# Patient Record
Sex: MALE | Race: WHITE | NOT HISPANIC OR LATINO | Employment: OTHER | URBAN - METROPOLITAN AREA
[De-identification: names, ages, dates, MRNs, and addresses within clinical notes are randomized per-mention and may not be internally consistent; named-entity substitution may affect disease eponyms.]

---

## 2018-07-15 ENCOUNTER — HOSPITAL ENCOUNTER (EMERGENCY)
Facility: HOSPITAL | Age: 28
Discharge: HOME | End: 2018-07-15
Attending: EMERGENCY MEDICINE
Payer: MEDICAID

## 2018-07-15 VITALS
OXYGEN SATURATION: 99 % | HEIGHT: 68 IN | BODY MASS INDEX: 34.86 KG/M2 | TEMPERATURE: 98 F | SYSTOLIC BLOOD PRESSURE: 128 MMHG | WEIGHT: 230 LBS | RESPIRATION RATE: 20 BRPM | HEART RATE: 87 BPM | DIASTOLIC BLOOD PRESSURE: 83 MMHG
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
## 2018-07-15 DIAGNOSIS — M54.2 BILATERAL NECK PAIN: Primary | ICD-10-CM

## 2018-07-15 DIAGNOSIS — M25.569 ANTERIOR KNEE PAIN: ICD-10-CM

## 2018-07-15 PROCEDURE — 25000003 PHARM REV CODE 250: Performed by: EMERGENCY MEDICINE

## 2018-07-15 PROCEDURE — 99284 EMERGENCY DEPT VISIT MOD MDM: CPT

## 2018-07-15 RX ORDER — OXYCODONE AND ACETAMINOPHEN 5; 325 MG/1; MG/1
1 TABLET ORAL
Status: COMPLETED | OUTPATIENT
Start: 2018-07-15 | End: 2018-07-15

## 2018-07-15 RX ORDER — BUSPIRONE HYDROCHLORIDE 5 MG/1
5 TABLET ORAL 2 TIMES DAILY
COMMUNITY
End: 2019-03-12

## 2018-07-15 RX ORDER — ALPRAZOLAM 0.5 MG/1
0.5 TABLET ORAL DAILY PRN
COMMUNITY
End: 2019-03-12

## 2018-07-15 RX ORDER — DICLOFENAC SODIUM 50 MG/1
50 TABLET, DELAYED RELEASE ORAL 2 TIMES DAILY
COMMUNITY
End: 2019-03-12

## 2018-07-15 RX ORDER — MELOXICAM 15 MG/1
15 TABLET ORAL DAILY
COMMUNITY
End: 2019-03-12

## 2018-07-15 RX ADMIN — OXYCODONE HYDROCHLORIDE AND ACETAMINOPHEN 1 TABLET: 5; 325 TABLET ORAL at 06:07

## 2018-07-15 NOTE — ED PROVIDER NOTES
Encounter Date: 7/15/2018       History     Chief Complaint   Patient presents with    Fall     pt fell 2 days ago and again yesterday. States his legs gave out. C/o pain to entire body, and states he has been in bed all day.      Chucho Ulloa is a 27 y.o. male who  has a past medical history of Muscular dystrophy.    The patient presents to the ED due to fall. He reports history of muscular dystrophy, and typically walks with an assistive device, usually a walker or crutches.  He states earlier today, he felt weak and fell onto his right leg.  He reports pain to the right knee.  He denies any syncope/loss of consciousness, head impact, or back pain. He does report bilateral muscular neck pain as well as diffuse body aches.  He denies any fever or recent illness.  He has not taken anything for pain.          Review of patient's allergies indicates:  No Known Allergies  Past Medical History:   Diagnosis Date    Muscular dystrophy      Past Surgical History:   Procedure Laterality Date    CYST REMOVAL      HERNIA REPAIR       No family history on file.  Social History   Substance Use Topics    Smoking status: Current Every Day Smoker     Packs/day: 0.50     Types: Cigarettes    Smokeless tobacco: Not on file    Alcohol use No     Review of Systems   Constitutional: Negative for chills and fever.   HENT: Negative for sore throat.    Respiratory: Negative for shortness of breath.    Cardiovascular: Negative for chest pain.   Gastrointestinal: Negative for constipation, diarrhea, nausea and vomiting.   Genitourinary: Negative for dysuria, frequency and urgency.   Musculoskeletal: Positive for arthralgias, myalgias and neck pain. Negative for back pain and neck stiffness.   Skin: Negative for rash and wound.   Neurological: Negative for weakness.   Hematological: Does not bruise/bleed easily.   Psychiatric/Behavioral: Negative for agitation, behavioral problems and confusion.       Physical Exam     Initial  Vitals [07/15/18 1711]   BP Pulse Resp Temp SpO2   128/83 87 20 98.3 °F (36.8 °C) 99 %      MAP       --         Physical Exam    Nursing note and vitals reviewed.  Constitutional: He appears well-developed and well-nourished. He is not diaphoretic. No distress.   HENT:   Head: Normocephalic and atraumatic.   Mouth/Throat: Oropharynx is clear and moist.   Eyes: EOM are normal. Pupils are equal, round, and reactive to light.   Neck: No tracheal deviation present.   Cardiovascular: Normal rate, regular rhythm, normal heart sounds and intact distal pulses.   Pulmonary/Chest: Breath sounds normal. No stridor. No respiratory distress.   Abdominal: Soft. He exhibits no distension and no mass. There is no tenderness.   Musculoskeletal: Normal range of motion. He exhibits no edema.        Right knee: He exhibits normal range of motion, no swelling, no ecchymosis, no deformity, no laceration and no bony tenderness. No tenderness found.        Left knee: He exhibits normal range of motion, no swelling, no ecchymosis, no deformity, no laceration and no bony tenderness. No tenderness found.        Cervical back: He exhibits no tenderness and no bony tenderness.        Thoracic back: He exhibits no tenderness and no bony tenderness.        Lumbar back: He exhibits no tenderness and no bony tenderness.        Back:    Enlarged calves bilaterally consistent with muscular dystrophy.  No bruising or obvious deformity.   Neurological: He is alert and oriented to person, place, and time. No cranial nerve deficit or sensory deficit.   Skin: Skin is warm and dry. Capillary refill takes less than 2 seconds. No rash noted.   Psychiatric: He has a normal mood and affect. His behavior is normal. Thought content normal.         ED Course   Procedures  Labs Reviewed - No data to display       Imaging Results          CT Cervical Spine Without Contrast (Final result)  Result time 07/15/18 19:18:50    Final result by Manjinder Francois MD (07/15/18  19:18:50)                 Impression:      No evidence of acute fracture or listhesis of the cervical spine.      Electronically signed by: Manjinder Francois MD  Date:    07/15/2018  Time:    19:18             Narrative:    EXAMINATION:  CT CERVICAL SPINE WITHOUT CONTRAST    CLINICAL HISTORY:  neck pain after fall;    TECHNIQUE:  Low dose axial images, sagittal and coronal reformations were performed though the cervical spine.  Contrast was not administered.    COMPARISON:  No direct comparison is available.    FINDINGS:  The visualized portions of the posterior fossa are within normal limits.  The craniocervical junction is within normal limits.  No prevertebral soft tissue swelling is identified.    The cervical alignment is unremarkable.  The vertebral body heights are maintained.  The posterior elements are within normal limits.  The lateral masses of C1 are nondisplaced.  The odontoid is unremarkable.  There is no evidence of perched or jumped facet.  The intervertebral disc spaces are within normal limits.  There is no evidence of mass effect in the spinal canal.  There is no evidence of spinal canal narrowing.    The visualized soft tissues of the neck are within normal limits.  There is no evidence of lymphadenopathy in the neck.  There is no evidence of a pneumothorax.                               X-Ray Chest AP Portable (Final result)  Result time 07/15/18 18:32:28    Final result by Asim Mcrae MD (07/15/18 18:32:28)                 Impression:      1. No acute cardiopulmonary process, hypoventilatory exam.      Electronically signed by: Asim Mcrae MD  Date:    07/15/2018  Time:    18:32             Narrative:    EXAMINATION:  XR CHEST AP PORTABLE    CLINICAL HISTORY:  rib pain after fall;    TECHNIQUE:  Single frontal view of the chest was performed.    COMPARISON:  10/27/2016    FINDINGS:  The cardiomediastinal silhouette is not enlarged.  There is no pleural effusion.  The trachea is midline.   The lungs are symmetrically expanded bilaterally with mildly coarse interstitial attenuation, likely related to shallow inspiratory effort.  There is minimal left basilar subsegmental atelectasis..  No large focal consolidation seen.  There is no pneumothorax.  The osseous structures are unremarkable.                               X-Ray Hips Bilateral 2 View Incl AP Pelvis (Final result)  Result time 07/15/18 18:33:25    Final result by Asim Mcrae MD (07/15/18 18:33:25)                 Impression:      1. No acute displaced fracture or dislocation of the pelvis or hips.      Electronically signed by: Asim Mcrae MD  Date:    07/15/2018  Time:    18:33             Narrative:    EXAMINATION:  XR HIPS BILATERAL 2 VIEW INCL AP PELVIS    CLINICAL HISTORY:  Unspecified fall, initial encounter    TECHNIQUE:  AP view of the pelvis and frogleg lateral views of both hips were performed.    COMPARISON:  None.    FINDINGS:  Three views.    The bilateral sacroiliac joints are intact.  The pubic symphysis is intact.  The bilateral femoral heads maintain anatomic relationship with their respective acetabula.  The visualized lower lumbar spine is intact.                               X-Ray Knee 3 View Right (Final result)  Result time 07/15/18 18:33:51    Final result by Asim Mcrae MD (07/15/18 18:33:51)                 Impression:      1. No acute displaced fracture or dislocation of the knee.      Electronically signed by: Asim Mcrae MD  Date:    07/15/2018  Time:    18:33             Narrative:    EXAMINATION:  XR KNEE 3 VIEW RIGHT    CLINICAL HISTORY:  Pain in unspecified knee    TECHNIQUE:  AP, lateral, and Merchant views of the right knee were performed.    COMPARISON:  None    FINDINGS:  Three views.    No acute displaced fracture or dislocation of the knee.  No radiopaque foreign body.  No significant knee joint effusion.                                 Medical Decision Making:   Initial Assessment:    27-year-old male with history of muscular dystrophy presents after mechanical fall.  Reports diffuse muscle aches, but states most pain present to right knee.  Exam grossly unremarkable, no deformity, bruising, or abrasion.  Plan to obtain x-rays and reassess.  Differential Diagnosis:   Differential Diagnosis includes, but is not limited to:  Fracture, dislocation, compartment syndrome, nerve injury/palsy, vascular injury, rhabdomyolysis, hemarthrosis, septic joint, bursitis, muscle strain, ligament tear/sprain, laceration with foreign body, abrasion, soft tissue contusion, osteoarthritis.    Clinical Tests:   Radiological Study: Ordered and Reviewed  ED Management:  X-rays grossly unremarkable, no evidence of acute fracture or traumatic injury.  Patient updated in reassured, counseled on symptomatic and supportive care, and recommend using assistance with ambulation as needed.  Recommend close follow-up with PCP or Neurology as needed.  Given return precautions including worsening symptoms or any other concerns.  Upon re-evaluation, the patient's status has improved.  After complete ED evaluation, clinical impression is most consistent with muscle weakness, contusions.  At this time, I feel there is no emergent condition requiring further evaluation or admission. I believe the patient is stable for discharge from the ED. The patient and any additional family present were updated with test results, overall clinical impression, and recommended further plan of care. All questions were answered. The patient expressed understanding and agreed with current plan for discharge with PCp follow-up within 1 week. Strict return precautions were provided, including return/worsening of current symptoms or any other concerns.                         Clinical Impression:   The primary encounter diagnosis was Bilateral neck pain. Diagnoses of Fall and Anterior knee pain were also pertinent to this visit.              IDr. Willoughby  Margaret, personally performed the services described in this documentation. All medical record entries made by the scribe were at my direction and in my presence.  I have reviewed the chart and agree that the record reflects my personal performance and is accurate and complete.     Case Robles MD.               Case Robles MD  08/01/18 [id number]

## 2018-08-04 ENCOUNTER — HOSPITAL ENCOUNTER (EMERGENCY)
Facility: HOSPITAL | Age: 28
Discharge: HOME | End: 2018-08-05
Attending: EMERGENCY MEDICINE
Payer: MEDICAID

## 2018-08-04 DIAGNOSIS — S80.02XA CONTUSION OF LEFT KNEE, INITIAL ENCOUNTER: ICD-10-CM

## 2018-08-04 DIAGNOSIS — S62.619A CLOSED DISPLACED FRACTURE OF PROXIMAL PHALANX OF FINGER OF RIGHT HAND: ICD-10-CM
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
## 2018-08-04 DIAGNOSIS — S89.90XA KNEE INJURY: ICD-10-CM

## 2018-08-04 PROCEDURE — 99283 EMERGENCY DEPT VISIT LOW MDM: CPT | Mod: 25

## 2018-08-04 RX ORDER — OXYCODONE AND ACETAMINOPHEN 5; 325 MG/1; MG/1
1 TABLET ORAL
Status: COMPLETED | OUTPATIENT
Start: 2018-08-05 | End: 2018-08-05

## 2018-08-05 VITALS
BODY MASS INDEX: 34.86 KG/M2 | WEIGHT: 230 LBS | TEMPERATURE: 98 F | HEART RATE: 85 BPM | SYSTOLIC BLOOD PRESSURE: 108 MMHG | OXYGEN SATURATION: 98 % | RESPIRATION RATE: 18 BRPM | HEIGHT: 68 IN | DIASTOLIC BLOOD PRESSURE: 71 MMHG

## 2018-08-05 PROCEDURE — 26720 TREAT FINGER FRACTURE EACH: CPT | Mod: F7

## 2018-08-05 PROCEDURE — 25000003 PHARM REV CODE 250: Performed by: EMERGENCY MEDICINE

## 2018-08-05 RX ORDER — OXYCODONE AND ACETAMINOPHEN 5; 325 MG/1; MG/1
1 TABLET ORAL EVERY 6 HOURS PRN
Qty: 15 TABLET | Refills: 0 | Status: SHIPPED | OUTPATIENT
Start: 2018-08-05 | End: 2018-10-21 | Stop reason: ALTCHOICE

## 2018-08-05 RX ADMIN — OXYCODONE HYDROCHLORIDE AND ACETAMINOPHEN 1 TABLET: 5; 325 TABLET ORAL at 12:08

## 2018-08-05 NOTE — DISCHARGE INSTRUCTIONS
Take medications as prescribed.  He may also take Mobic or Voltaren as needed for pain.  If you do not have those, Naprosyn or Motrin can also be taken.  Rest, ice and elevate your affected areas.  Follow-up with the orthopedists as recommended in the next week or 2.    Thank you for choosing [facility]! We appreciate you coming to us for your medical care. We hope you feel better soon! Please come back to [facility] for all of your future medical needs.      Sincerely,    Ta Pearson MD  Medical Director  Emergency Department  [facility]

## 2018-08-05 NOTE — ED NOTES
Foam/metal Splint applied to middle finger of right hand. Instructed to leave on finger at all times until seen by Dr. Perez.  Discharge instructions given and explained.  Prescriptions given and explained.

## 2018-08-05 NOTE — ED NOTES
To ER with c/o pain and swelling to right middle finger and hand and left anterior lower knee after falling at 1100 am today.  Pt states that his swelling has increased as the day has progressed.  Denies hitting his head or LOC.  Awake and alert, oriented x 4.  resp even and unlabored.  Lungs clear.  abd soft and non tender.

## 2018-08-11 NOTE — ED PROVIDER NOTES
Encounter Date: 8/4/2018       History     Chief Complaint   Patient presents with    Fall     Foot got caught in door and lost his balance. Complaning of right hand pain and left knee pain     HPI   This is a 27 y.o. male who has a past medical history of Muscular dystrophy.   The patient presents to the Emergency Department s/p fall.  Patient is difficulty with ambulation and he got his foot caught in the door and lost balance.  He does not know how he fell but his left knee and right hand hurt.  Symptoms are associated with swelling and bruising to right hand.  Patient is unable to close his fist.  Pt denies wounds, head injury, LOC.   Symptoms are aggravated by movement and palpation.  Symptoms are relieved by nothing.   Pt has a past surgical history that includes Cyst Removal and Hernia repair.     Review of patient's allergies indicates:  No Known Allergies  Past Medical History:   Diagnosis Date    Muscular dystrophy      Past Surgical History:   Procedure Laterality Date    CYST REMOVAL      HERNIA REPAIR       No family history on file.  Social History   Substance Use Topics    Smoking status: Current Every Day Smoker     Packs/day: 0.50     Types: Cigarettes    Smokeless tobacco: Not on file    Alcohol use No     Review of Systems   Constitutional: Negative for fever.   HENT: Negative for sore throat.    Respiratory: Negative for shortness of breath.    Cardiovascular: Negative for chest pain.   Gastrointestinal: Negative for nausea.   Genitourinary: Negative for dysuria.   Musculoskeletal: Positive for arthralgias and gait problem. Negative for back pain.   Skin: Positive for color change. Negative for rash.   Neurological: Negative for weakness.   Hematological: Does not bruise/bleed easily.       Physical Exam     Initial Vitals [08/04/18 2311]   BP Pulse Resp Temp SpO2   135/86 88 18 98.5 °F (36.9 °C) 98 %      MAP       --         Physical Exam    Nursing note and vitals  reviewed.  Constitutional: He appears well-developed and well-nourished. No distress.   HENT:   Head: Normocephalic and atraumatic.   Mouth/Throat: Oropharynx is clear and moist.   Eyes: Conjunctivae are normal. Pupils are equal, round, and reactive to light.   Neck: Normal range of motion. Neck supple.   Cardiovascular: Normal rate, regular rhythm, normal heart sounds and intact distal pulses.   Radial pulses 2+ bilaterally   Pulmonary/Chest: Breath sounds normal. No respiratory distress.   Abdominal: Soft. Bowel sounds are normal. He exhibits no distension. There is no tenderness.   Musculoskeletal: Normal range of motion. He exhibits no edema or tenderness.   Ecchymosis and swelling to the right 3rd MTP, tender to palpation. Decreased range of motion secondary to pain.    Mild tenderness to the patella of the left knee.  There is normal range of motion, negative for laxity and Lachman's test    Lymphadenopathy:     He has no cervical adenopathy.   Neurological: He is alert and oriented to person, place, and time.   Skin: Skin is warm and dry. Capillary refill takes less than 2 seconds. No rash noted. No erythema.   Psychiatric: He has a normal mood and affect. Thought content normal.         ED Course   Procedures  Labs Reviewed - No data to display       Imaging Results          X-Ray Knee 3 View Left (Final result)  Result time 08/05/18 00:29:17    Final result by Chucho Medeiros MD (08/05/18 00:29:17)                 Impression:      No acute fracture.      Electronically signed by: Chucho Medeiros MD  Date:    08/05/2018  Time:    00:29             Narrative:    EXAMINATION:  XR KNEE 3 VIEW LEFT    CLINICAL HISTORY:  Unspecified injury of unspecified lower leg, initial encounter    TECHNIQUE:  AP, lateral, and Merchant views of the left knee were performed.    COMPARISON:  Right knee July 15, 2018.    FINDINGS:  No fracture or dislocation.  No joint effusion.  Mild narrowing of the medial tibial femoral  compartment, similar to the right knee on the July 15, 2018 exam.                               X-Ray Hand 3 view Right (Final result)  Result time 08/05/18 00:26:24    Final result by Chucho Medeiros MD (08/05/18 00:26:24)                 Impression:      Minimally displaced acute oblique fracture involving the posteromedial aspect of the base of the proximal phalanx of the 3rd digit right hand with intra-articular extension to the MCP joint.    No additional fracture.  No dislocation.      Electronically signed by: Chucho Medeiros MD  Date:    08/05/2018  Time:    00:26             Narrative:    EXAMINATION:  XR HAND COMPLETE 3 VIEW RIGHT    CLINICAL HISTORY:  fall, 3-4th MTP pain and finger swelling;    TECHNIQUE:  PA, lateral, and oblique views of the right hand were performed.    COMPARISON:  None    FINDINGS:  Minimally displaced acute oblique fracture involving the posteromedial aspect of the base of the proximal phalanx of the 3rd digit right hand with intra-articular extension.  No additional fracture.  No dislocation.  Surrounding soft tissue swelling present.                                   Medical Decision Making:   Initial Assessment:   This is an emergent evaluation of a 27 y.o.male patient with presentation of right hand pain and left knee pain s/p fall.   Initial differentials include but are not limited to:  Contusion, fracture, sprain, subluxation/dislocation  Plan:  X-ray right hand and knee, pain control, ice    ED Management:  X-ray of the right hand was remarkable for fracture of the proximal phalanx of the middle finger.  X-ray of the left knee was unremarkable. Patient put in finger splint, referred to Ortho hand.  Rx for pain control, return for any concerns.                      Clinical Impression:     1. Fall, initial encounter    2. Knee injury    3. Closed displaced fracture of proximal phalanx of finger of right hand    4. Contusion of left knee, initial encounter                                 Ta Pearson MD  08/11/18 0506

## 2018-10-21 ENCOUNTER — OFFICE VISIT (OUTPATIENT)
Dept: URGENT CARE | Facility: CLINIC | Age: 28
End: 2018-10-21
Payer: MEDICAID

## 2018-10-21 VITALS
OXYGEN SATURATION: 99 % | DIASTOLIC BLOOD PRESSURE: 85 MMHG | SYSTOLIC BLOOD PRESSURE: 125 MMHG | WEIGHT: 230 LBS | BODY MASS INDEX: 34.86 KG/M2 | RESPIRATION RATE: 18 BRPM | HEIGHT: 68 IN | HEART RATE: 92 BPM | TEMPERATURE: 98 F

## 2018-10-21 DIAGNOSIS — M54.12 CERVICAL RADICULAR PAIN: ICD-10-CM

## 2018-10-21 DIAGNOSIS — M54.2 NECK PAIN: ICD-10-CM

## 2018-10-21 DIAGNOSIS — M62.838 CERVICAL PARASPINAL MUSCLE SPASM: Primary | ICD-10-CM

## 2018-10-21 PROCEDURE — 72040 X-RAY EXAM NECK SPINE 2-3 VW: CPT | Mod: FY,S$GLB,, | Performed by: RADIOLOGY

## 2018-10-21 PROCEDURE — 99203 OFFICE O/P NEW LOW 30 MIN: CPT | Mod: S$GLB,,, | Performed by: PHYSICIAN ASSISTANT

## 2018-10-21 RX ORDER — HYDROCODONE BITARTRATE AND ACETAMINOPHEN 7.5; 325 MG/1; MG/1
1 TABLET ORAL EVERY 8 HOURS PRN
Qty: 12 TABLET | Refills: 0 | Status: SHIPPED | OUTPATIENT
Start: 2018-10-21 | End: 2018-10-25

## 2018-10-21 NOTE — PATIENT INSTRUCTIONS
Neck Pain    There are several possible causes of neck pain when there is no injury:  · You can get a minor ligament sprain or muscle strain from a sudden minor neck movement. Sleeping with your neck in an awkward position can also cause this.  · Some people respond to emotional stress by tensing the muscles of their neck, shoulders, and upper back. Chronic spasm in these muscles can cause neck pain and sometimes headaches.  · Gradual wear and tear of the joints in the spine can cause degenerative arthritis. This can be a source of occasional or chronic neck pain.  · The spinal disks may bulge and put pressure on a nearby spinal nerve. This can happen as a natural result of aging or repeated small injuries to the neck. The spinal disks are the cushions between each spinal bone. This causes tingling, pain, or numbness that spreads from the neck to the shoulder, arm, or hand on one side.  Acute neck pain usually gets better in 1 to 2 weeks. Neck pain related to disk disease, arthritis in the spinal joints, or spinal stenosis can become chronic and last for months or years. Spinal stenosis is narrowing of the spinal canal.  X-rays are usually not ordered for the initial evaluation of neck pain. However, X-rays may be done if you had a forceful physical injury, such as a car accident or fall. If pain continues and doesnt respond to medical treatment, X-rays and other tests may be done at a later time.  Home care  · Rest and relax the muscles. Use a comfortable pillow that supports the head. It should also help keep the spine in a neutral position. The position of the head should not be tilted forward or backward. A rolled up towel may help for a custom fit.  · Some people find relief with heat. Heat can be applied with either a warm shower or bath or a moist towel heated in the microwave and massage. Others prefer cold packs. You can make an ice pack by filling a plastic bag that seals at the top with ice cubes or  crushed ice and then wrapping it with a thin towel. Try both and use the method that feels best for 15 to 20 minutes, several times a day.  · Whether using ice or heat, be careful that you do not injure your skin. Never put ice directly on the skin. Always wrap the ice in a towel or other type of cloth.This is very important, especially in people with poor skin sensations.   · Try to reduce your stress level. Emotional stress can lead to neck muscle tension and get in the way of or delay the healing process.  · You may use over-the-counter pain medicine to control pain, unless another medicine was prescribed. If you have chronic liver or kidney disease or ever had a stomach ulcer or GI bleeding, talk with your healthcare provider before using these medicines.  Follow-up care  Follow up with your healthcare provider if your symptoms do not show signs of improvement after one week. Physical therapy or further tests may be needed.  If X-rays, CT scans, or MRI scans were taken, you will be told of any new findings that may affect your care.  Call 911  Call 911 if you have:  · Sudden weakness or numbness in one or both arms  · Neck swelling, difficulty or painful swallowing  · Difficulty breathing  · Chest pain  When to seek medical advice  Call your healthcare provider right away if any of these occur:  · Pain becomes worse or spreads into one or both arm  · Increasing headache  · Fever of 100.4°F (38°C) or above lasting for 24 to 48 hours  Date Last Reviewed: 7/1/2016  © 3294-6671 [employer]. [address] [place] [address], [place] [address]. All rights reserved. This information is not intended as a substitute for professional medical care. Always follow your healthcare professional's instructions.      Please follow up with your Primary care provider within 2-5 days if your signs and symptoms have not resolved or worsen.     If your condition worsens or fails to improve we recommend that you receive another  evaluation at the emergency room immediately or contact your primary medical clinic to discuss your concerns.   You must understand that you have received an Urgent Care treatment only and that you may be released before all of your medical problems are known or treated. You, the patient, will arrange for follow up care as instructed.     RED FLAGS/WARNING SYMPTOMS DISCUSSED WITH PATIENT THAT WOULD WARRANT EMERGENT MEDICAL ATTENTION. PATIENT VERBALIZED UNDERSTANDING.

## 2018-10-21 NOTE — PROGRESS NOTES
"Subjective:       Patient ID: Chucho Ulloa is a 27 y.o. male.    Vitals:  height is 5' 8" (1.727 m) and weight is 104.3 kg (230 lb). His oral temperature is 98.2 °F (36.8 °C). His blood pressure is 125/85 and his pulse is 92. His respiration is 18 and oxygen saturation is 99%.     Chief Complaint: Neck Pain (fell backward on 10/20/2018 outside  on concrete)    History of muscular dystrophy and instability. Did not hit his head or lose consciousness. Complains of neck pain. Had one episode of sharp shooting pain down his left arm. Denies numbness or tingling of arms or legs.       Neck Pain    This is a new problem. The current episode started yesterday. The problem occurs constantly. The problem has been gradually worsening. The pain is associated with a fall. The pain is present in the midline. The quality of the pain is described as stabbing. The pain is at a severity of 8/10. The pain is severe. The symptoms are aggravated by bending, position, twisting, sneezing and coughing. The pain is same all the time. Stiffness is present all day. Pertinent negatives include no chest pain, numbness, syncope or weakness. He has tried nothing for the symptoms.     Review of Systems   Constitution: Negative for weakness and malaise/fatigue.   HENT: Negative for nosebleeds.    Cardiovascular: Negative for chest pain and syncope.   Respiratory: Negative for shortness of breath.    Musculoskeletal: Positive for falls, joint swelling, muscle cramps and neck pain. Negative for back pain, joint pain and stiffness.   Gastrointestinal: Negative for abdominal pain.   Genitourinary: Negative for hematuria.   Neurological: Negative for dizziness and numbness.       Objective:      Physical Exam   Constitutional: He is oriented to person, place, and time. He appears well-developed and well-nourished. He is cooperative.  Non-toxic appearance. He does not appear ill. No distress.   HENT:   Head: Normocephalic and atraumatic.   Right Ear: " Hearing, tympanic membrane, external ear and ear canal normal.   Left Ear: Hearing, tympanic membrane, external ear and ear canal normal.   Nose: Nose normal. No mucosal edema, rhinorrhea or nasal deformity. No epistaxis. Right sinus exhibits no maxillary sinus tenderness and no frontal sinus tenderness. Left sinus exhibits no maxillary sinus tenderness and no frontal sinus tenderness.   Mouth/Throat: Uvula is midline, oropharynx is clear and moist and mucous membranes are normal. No trismus in the jaw. Normal dentition. No uvula swelling. No posterior oropharyngeal erythema.   Eyes: Conjunctivae and lids are normal. Right eye exhibits no discharge. Left eye exhibits no discharge. No scleral icterus.   Sclera clear bilat   Neck: Trachea normal and phonation normal. Neck supple. Spinous process tenderness and muscular tenderness present. Decreased range of motion present. No edema and no erythema present.       Cardiovascular: Normal rate, regular rhythm, normal heart sounds, intact distal pulses and normal pulses.   Pulmonary/Chest: Effort normal and breath sounds normal. No respiratory distress.   Abdominal: Soft. Normal appearance and bowel sounds are normal. He exhibits no distension, no pulsatile midline mass and no mass. There is no tenderness.   Musculoskeletal: He exhibits no edema or deformity.        Cervical back: He exhibits decreased range of motion, tenderness, bony tenderness, pain and spasm. He exhibits no swelling, no edema, no deformity, no laceration and normal pulse.        Thoracic back: He exhibits normal range of motion, no tenderness, no bony tenderness, no swelling, no edema, no deformity, no laceration, no pain, no spasm and normal pulse.        Lumbar back: He exhibits normal range of motion, no tenderness, no bony tenderness, no swelling, no edema, no deformity, no laceration, no pain, no spasm and normal pulse.        Back:    NEG ST LEG RAISE  FULL ROM B UE AND LE WITH 5/5 STRENGTH  NVIT  DISTALLY WITH SILT AND 2+BCR  ABLE TO AMBULATE WITH SMOOTH RHYTHMIC GAIT     Neurological: He is alert and oriented to person, place, and time. No cranial nerve deficit or sensory deficit. He exhibits abnormal muscle tone (muscular atrophy noted with fatty calves and leg weakness; normal for patient). Coordination normal.   Reflex Scores:       Tricep reflexes are 2+ on the right side and 2+ on the left side.       Bicep reflexes are 2+ on the right side and 2+ on the left side.  Skin: Skin is warm, dry and intact. He is not diaphoretic. No pallor.   Psychiatric: He has a normal mood and affect. His speech is normal and behavior is normal. Judgment and thought content normal. Cognition and memory are normal.   Nursing note and vitals reviewed.      XRAY: No fractures noted. No acute injuries noted    Assessment:       1. Cervical paraspinal muscle spasm    2. Cervical radicular pain    3. Neck pain        Plan:         Cervical paraspinal muscle spasm  -     HYDROcodone-acetaminophen (NORCO) 7.5-325 mg per tablet; Take 1 tablet by mouth every 8 (eight) hours as needed for Pain.  Dispense: 12 tablet; Refill: 0    Cervical radicular pain    Neck pain  -     X-Ray Cervical Spine 2 or 3 Views; Future; Expected date: 10/21/2018    UNABLE TO TAKE MUSCLE RELAXERS DUE TO DYSTROPHY. NSAIDS GIVE PATIENT MUSCLE CRAMPING.           Neck Pain    There are several possible causes of neck pain when there is no injury:  · You can get a minor ligament sprain or muscle strain from a sudden minor neck movement. Sleeping with your neck in an awkward position can also cause this.  · Some people respond to emotional stress by tensing the muscles of their neck, shoulders, and upper back. Chronic spasm in these muscles can cause neck pain and sometimes headaches.  · Gradual wear and tear of the joints in the spine can cause degenerative arthritis. This can be a source of occasional or chronic neck pain.  · The spinal disks may bulge and  put pressure on a nearby spinal nerve. This can happen as a natural result of aging or repeated small injuries to the neck. The spinal disks are the cushions between each spinal bone. This causes tingling, pain, or numbness that spreads from the neck to the shoulder, arm, or hand on one side.  Acute neck pain usually gets better in 1 to 2 weeks. Neck pain related to disk disease, arthritis in the spinal joints, or spinal stenosis can become chronic and last for months or years. Spinal stenosis is narrowing of the spinal canal.  X-rays are usually not ordered for the initial evaluation of neck pain. However, X-rays may be done if you had a forceful physical injury, such as a car accident or fall. If pain continues and doesnt respond to medical treatment, X-rays and other tests may be done at a later time.  Home care  · Rest and relax the muscles. Use a comfortable pillow that supports the head. It should also help keep the spine in a neutral position. The position of the head should not be tilted forward or backward. A rolled up towel may help for a custom fit.  · Some people find relief with heat. Heat can be applied with either a warm shower or bath or a moist towel heated in the microwave and massage. Others prefer cold packs. You can make an ice pack by filling a plastic bag that seals at the top with ice cubes or crushed ice and then wrapping it with a thin towel. Try both and use the method that feels best for 15 to 20 minutes, several times a day.  · Whether using ice or heat, be careful that you do not injure your skin. Never put ice directly on the skin. Always wrap the ice in a towel or other type of cloth.This is very important, especially in people with poor skin sensations.   · Try to reduce your stress level. Emotional stress can lead to neck muscle tension and get in the way of or delay the healing process.  · You may use over-the-counter pain medicine to control pain, unless another medicine was  prescribed. If you have chronic liver or kidney disease or ever had a stomach ulcer or GI bleeding, talk with your healthcare provider before using these medicines.  Follow-up care  Follow up with your healthcare provider if your symptoms do not show signs of improvement after one week. Physical therapy or further tests may be needed.  If X-rays, CT scans, or MRI scans were taken, you will be told of any new findings that may affect your care.  Call 911  Call 911 if you have:  · Sudden weakness or numbness in one or both arms  · Neck swelling, difficulty or painful swallowing  · Difficulty breathing  · Chest pain  When to seek medical advice  Call your healthcare provider right away if any of these occur:  · Pain becomes worse or spreads into one or both arm  · Increasing headache  · Fever of 100.4°F (38°C) or above lasting for 24 to 48 hours  Date Last Reviewed: 7/1/2016  © 1619-6959 [employer]. [address] [place] [address]. All rights reserved. This information is not intended as a substitute for professional medical care. Always follow your healthcare professional's instructions.      Please follow up with your Primary care provider within 2-5 days if your signs and symptoms have not resolved or worsen.     If your condition worsens or fails to improve we recommend that you receive another evaluation at the emergency room immediately or contact your primary medical clinic to discuss your concerns.   You must understand that you have received an Urgent Care treatment only and that you may be released before all of your medical problems are known or treated. You, the patient, will arrange for follow up care as instructed.     RED FLAGS/WARNING SYMPTOMS DISCUSSED WITH PATIENT THAT WOULD WARRANT EMERGENT MEDICAL ATTENTION. PATIENT VERBALIZED UNDERSTANDING.

## 2019-03-12 ENCOUNTER — HOSPITAL ENCOUNTER (EMERGENCY)
Facility: HOSPITAL | Age: 29
Discharge: HOME | End: 2019-03-12
Attending: EMERGENCY MEDICINE
Payer: MEDICAID

## 2019-03-12 VITALS
RESPIRATION RATE: 20 BRPM | TEMPERATURE: 99 F | HEIGHT: 68 IN | WEIGHT: 235 LBS | OXYGEN SATURATION: 99 % | DIASTOLIC BLOOD PRESSURE: 94 MMHG | SYSTOLIC BLOOD PRESSURE: 134 MMHG | BODY MASS INDEX: 35.61 KG/M2 | HEART RATE: 91 BPM

## 2019-03-12 DIAGNOSIS — L73.9 FOLLICULITIS: ICD-10-CM

## 2019-03-12 DIAGNOSIS — L08.9 SKIN PUSTULE: Primary | ICD-10-CM

## 2019-03-12 PROCEDURE — 99284 EMERGENCY DEPT VISIT MOD MDM: CPT

## 2019-03-12 RX ORDER — SULFAMETHOXAZOLE AND TRIMETHOPRIM 800; 160 MG/1; MG/1
1 TABLET ORAL 2 TIMES DAILY
Qty: 10 TABLET | Refills: 0 | Status: SHIPPED | OUTPATIENT
Start: 2019-03-12 | End: 2019-03-17

## 2019-03-13 NOTE — ED NOTES
APPEARANCE: Alert, oriented and in no acute distress.  CARDIAC: Normal rate and rhythm, no murmur heard.   PERIPHERAL VASCULAR: peripheral pulses present. Normal cap refill. No edema. Warm to touch.    RESPIRATORY:Normal rate and effort, breath sounds clear bilaterally throughout chest. Respirations are equal and unlabored no obvious signs of distress.  GASTRO: soft, bowel sounds normal, no tenderness, no abdominal distention.  MUSC: Full ROM. No bony tenderness or soft tissue tenderness. No obvious deformity.  SKIN: Skin is warm and dry, normal skin turgor, mucous membranes moist. Pt has several small bumps and one larger quarter sized bump to left side of neck.   NEURO: 5/5 strength major flexors/extensors bilaterally. Sensory intact to light touch bilaterally. [place] Mark coma scale: eyes open spontaneously-4, oriented & converses-5, obeys commands-6. No neurological abnormalities.   MENTAL STATUS: awake, alert and aware of environment.  EYE: PERRL, both eyes: pupils brisk and reactive to light. Normal size.  ENT: EARS: no obvious drainage. NOSE: no active bleeding.   Pt complains of abscesses to his left neck area.

## 2019-03-13 NOTE — ED PROVIDER NOTES
Encounter Date: 3/12/2019    SCRIBE #1 NOTE: I, Laine Nur, am scribing for, and in the presence of,  Dr. Robles. I have scribed the entire note.       History     Chief Complaint   Patient presents with    Abscess     on left side of neck x3 days; denies fever or drainage;      Chucho Ulloa is a 28 y.o. male who  has a past medical history of Muscular dystrophy.    The patient presents to the ED due to possible abscess. He reports he started noticing swelling about a week ago while on vacation in [place]. He reports it gradually became more swollen, and noticed today the lesion has been draining blood. The patient denies any purulent drainage or fever but admits to mild nausea. He does note 4 days ago he had chills which resolved after at hot shower. The patient has experienced similar symptoms multiple times in the past, and states he has needed them to be drained in the past.      The history is provided by the patient.     Review of patient's allergies indicates:  No Known Allergies  Past Medical History:   Diagnosis Date    Muscular dystrophy      Past Surgical History:   Procedure Laterality Date    CYST REMOVAL      HERNIA REPAIR       History reviewed. No pertinent family history.  Social History     Tobacco Use    Smoking status: Current Every Day Smoker     Packs/day: 0.50     Types: Cigarettes    Smokeless tobacco: Never Used   Substance Use Topics    Alcohol use: No    Drug use: No     Review of Systems   Constitutional: Negative for chills and fever.   HENT: Negative for congestion, ear pain, rhinorrhea and sore throat.    Respiratory: Negative for cough, shortness of breath and wheezing.    Cardiovascular: Negative for chest pain and palpitations.   Gastrointestinal: Negative for abdominal pain, diarrhea, nausea and vomiting.   Genitourinary: Negative for dysuria and hematuria.   Musculoskeletal: Negative for back pain, myalgias and neck pain.   Skin: Positive for wound. Negative for  rash.   Neurological: Negative for dizziness, weakness, light-headedness and headaches.   Psychiatric/Behavioral: Negative for confusion.       Physical Exam     Initial Vitals [03/12/19 2104]   BP Pulse Resp Temp SpO2   (!) 134/94 91 20 98.5 °F (36.9 °C) 99 %      MAP       --         Physical Exam    Nursing note and vitals reviewed.  Constitutional: He appears well-developed and well-nourished. He is not diaphoretic. No distress.   HENT:   Head: Normocephalic and atraumatic.   Mouth/Throat: Oropharynx is clear and moist.   Eyes: EOM are normal. Pupils are equal, round, and reactive to light.   Neck: No tracheal deviation present.   Cardiovascular: Normal rate, regular rhythm, normal heart sounds and intact distal pulses.   Pulmonary/Chest: Breath sounds normal. No stridor. No respiratory distress.   Abdominal: Soft. He exhibits no distension and no mass. There is no tenderness.   Musculoskeletal: Normal range of motion. He exhibits no edema.   Neurological: He is alert and oriented to person, place, and time. No cranial nerve deficit or sensory deficit.   Skin: Skin is warm and dry. Capillary refill takes less than 2 seconds. Lesion and rash noted. No abrasion noted. Rash is nodular.   Approximately 1.5 cm pustule to left neck.   Multiple scattered smaller areas of folliculitis to L cheek and face.   Psychiatric: He has a normal mood and affect. His behavior is normal. Thought content normal.         ED Course   Procedures  Labs Reviewed - No data to display       Imaging Results    None          Medical Decision Making:   Initial Assessment:   28 y.o male presents with a raised, irritated pustule to left neck starting 10 days and progressively worsening; noticed spontaneous drainage described as bloody fluid today. Exam with small pustule and multiple other scattered areas of folliculitis. Bedside US revealed no drainable fluid collection. Will treat with Bactrim and Bactroban ointment, follow up with PCP as  needed or return for worsening symptoms.   Differential Diagnosis:   Differential Diagnosis includes, but is not limited to:  Cellulitis, abscess, necrotizing fasciitis, osteomyelitis, septic joint, MRSA, DVT, drug eruption, allergic/contact dermatitis, EM/SJS, viral exanthem, local trauma/contusion    Upon re-evaluation, the patient's status has improved.  After complete ED evaluation, clinical impression is most consistent with folliculitis, pustule.  PCP follow-up within 1 week was recommended.    After taking into careful account the patient's history, physical exam findings, as well as empirical and objective data obtained throughout ED workup, I feel no emergent medical condition has been identified. No further evaluation or admission was felt to be required, and the patient is stable for discharge from the ED. The patient and any additional family present were updated with test results, overall clinical impression, and recommended further plan of care, including discharge instructions as provided and outpatient follow-up for continued evaluation and management as needed. All questions were answered. The patient expressed understanding and agreed with current plan for discharge and follow-up plan of care. Strict ED return precautions were provided, including return/worsening of current symptoms, new symptoms, or any other concerns.                        Clinical Impression:     1. Skin pustule    2. Folliculitis             I, Dr. Case Robles, personally performed the services described in this documentation. All medical record entries made by the scribe were at my direction and in my presence.  I have reviewed the chart and agree that the record reflects my personal performance and is accurate and complete.     Case Robles MD.               Case Robles MD  03/13/19 [id number]

## 2019-09-09 ENCOUNTER — HOSPITAL ENCOUNTER (EMERGENCY)
Facility: HOSPITAL | Age: 29
Discharge: LEFT BEFORE DISCHARGE | End: 2019-09-09
Payer: MEDICAID

## 2019-09-09 VITALS
HEART RATE: 121 BPM | HEIGHT: 68 IN | SYSTOLIC BLOOD PRESSURE: 125 MMHG | DIASTOLIC BLOOD PRESSURE: 79 MMHG | WEIGHT: 240 LBS | BODY MASS INDEX: 36.37 KG/M2 | TEMPERATURE: 100 F | RESPIRATION RATE: 20 BRPM | OXYGEN SATURATION: 98 %

## 2019-09-09 DIAGNOSIS — R50.9 FEVER, UNSPECIFIED FEVER CAUSE: Primary | ICD-10-CM

## 2019-09-09 DIAGNOSIS — L03.116 LEFT LEG CELLULITIS: ICD-10-CM

## 2019-09-09 LAB
ALBUMIN SERPL BCP-MCNC: 4.2 G/DL (ref 3.5–5.2)
ALP SERPL-CCNC: 67 U/L (ref 55–135)
ALT SERPL W/O P-5'-P-CCNC: 29 U/L (ref 10–44)
ANION GAP SERPL CALC-SCNC: 13 MMOL/L (ref 8–16)
AST SERPL-CCNC: 25 U/L (ref 10–40)
BILIRUB SERPL-MCNC: 0.6 MG/DL (ref 0.1–1)
BUN SERPL-MCNC: 8 MG/DL (ref 6–20)
CALCIUM SERPL-MCNC: 8.9 MG/DL (ref 8.7–10.5)
CHLORIDE SERPL-SCNC: 108 MMOL/L (ref 95–110)
CO2 SERPL-SCNC: 14 MMOL/L (ref 23–29)
CREAT SERPL-MCNC: 0.6 MG/DL (ref 0.5–1.4)
EST. GFR  (AFRICAN AMERICAN): >60 ML/MIN/1.73 M^2
EST. GFR  (NON AFRICAN AMERICAN): >60 ML/MIN/1.73 M^2
GLUCOSE SERPL-MCNC: 92 MG/DL (ref 70–110)
POTASSIUM SERPL-SCNC: 3.5 MMOL/L (ref 3.5–5.1)
PROT SERPL-MCNC: 7.4 G/DL (ref 6–8.4)
SODIUM SERPL-SCNC: 135 MMOL/L (ref 136–145)

## 2019-09-09 PROCEDURE — 99281 EMR DPT VST MAYX REQ PHY/QHP: CPT

## 2019-09-09 PROCEDURE — 80053 COMPREHEN METABOLIC PANEL: CPT

## 2019-09-09 PROCEDURE — 87040 BLOOD CULTURE FOR BACTERIA: CPT

## 2019-09-09 RX ORDER — CLINDAMYCIN PHOSPHATE 600 MG/50ML
600 INJECTION, SOLUTION INTRAVENOUS
Status: DISCONTINUED | OUTPATIENT
Start: 2019-09-09 | End: 2019-09-10 | Stop reason: HOSPADM

## 2019-09-10 ENCOUNTER — HOSPITAL ENCOUNTER (OUTPATIENT)
Facility: HOSPITAL | Age: 29
Discharge: HOME | End: 2019-09-12
Attending: INTERNAL MEDICINE | Admitting: HOSPITALIST
Payer: MEDICAID

## 2019-09-10 DIAGNOSIS — A41.9 SEPSIS, DUE TO UNSPECIFIED ORGANISM: ICD-10-CM

## 2019-09-10 DIAGNOSIS — L03.116 CELLULITIS OF LEFT LEG: Primary | ICD-10-CM

## 2019-09-10 DIAGNOSIS — G71.00 MUSCULAR DYSTROPHY: ICD-10-CM

## 2019-09-10 PROBLEM — E87.1 HYPONATREMIA: Status: ACTIVE | Noted: 2019-09-10

## 2019-09-10 PROBLEM — D72.829 LEUKOCYTOSIS: Status: ACTIVE | Noted: 2019-09-10

## 2019-09-10 LAB
ALBUMIN SERPL BCP-MCNC: 3.3 G/DL (ref 3.5–5.2)
ALP SERPL-CCNC: 52 U/L (ref 55–135)
ALT SERPL W/O P-5'-P-CCNC: 26 U/L (ref 10–44)
ANION GAP SERPL CALC-SCNC: 9 MMOL/L (ref 8–16)
AST SERPL-CCNC: 23 U/L (ref 10–40)
BASOPHILS # BLD AUTO: 0.02 K/UL (ref 0–0.2)
BASOPHILS NFR BLD: 0.2 % (ref 0–1.9)
BILIRUB SERPL-MCNC: 0.7 MG/DL (ref 0.1–1)
BILIRUB UR QL STRIP: NEGATIVE
BUN SERPL-MCNC: 6 MG/DL (ref 6–20)
CALCIUM SERPL-MCNC: 8.2 MG/DL (ref 8.7–10.5)
CHLORIDE SERPL-SCNC: 108 MMOL/L (ref 95–110)
CLARITY UR: CLEAR
CO2 SERPL-SCNC: 18 MMOL/L (ref 23–29)
COLOR UR: YELLOW
CREAT SERPL-MCNC: 0.5 MG/DL (ref 0.5–1.4)
CRP SERPL-MCNC: 172.2 MG/L (ref 0–8.2)
DIFFERENTIAL METHOD: ABNORMAL
EOSINOPHIL # BLD AUTO: 0 K/UL (ref 0–0.5)
EOSINOPHIL NFR BLD: 0.1 % (ref 0–8)
ERYTHROCYTE [DISTWIDTH] IN BLOOD BY AUTOMATED COUNT: 13.3 % (ref 11.5–14.5)
ERYTHROCYTE [SEDIMENTATION RATE] IN BLOOD BY WESTERGREN METHOD: 12 MM/HR (ref 0–10)
EST. GFR  (AFRICAN AMERICAN): >60 ML/MIN/1.73 M^2
EST. GFR  (NON AFRICAN AMERICAN): >60 ML/MIN/1.73 M^2
GLUCOSE SERPL-MCNC: 111 MG/DL (ref 70–110)
GLUCOSE UR QL STRIP: NEGATIVE
HCT VFR BLD AUTO: 46.4 % (ref 40–54)
HGB BLD-MCNC: 15.4 G/DL (ref 14–18)
HGB UR QL STRIP: ABNORMAL
KETONES UR QL STRIP: NEGATIVE
LACTATE SERPL-SCNC: 1.9 MMOL/L (ref 0.5–2.2)
LACTATE SERPL-SCNC: 2.2 MMOL/L (ref 0.5–2.2)
LEUKOCYTE ESTERASE UR QL STRIP: NEGATIVE
LYMPHOCYTES # BLD AUTO: 1.1 K/UL (ref 1–4.8)
LYMPHOCYTES NFR BLD: 8 % (ref 18–48)
MCH RBC QN AUTO: 29.6 PG (ref 27–31)
MCHC RBC AUTO-ENTMCNC: 33.2 G/DL (ref 32–36)
MCV RBC AUTO: 89 FL (ref 82–98)
MONOCYTES # BLD AUTO: 0.9 K/UL (ref 0.3–1)
MONOCYTES NFR BLD: 6.7 % (ref 4–15)
NEUTROPHILS # BLD AUTO: 11.2 K/UL (ref 1.8–7.7)
NEUTROPHILS NFR BLD: 85 % (ref 38–73)
NITRITE UR QL STRIP: NEGATIVE
PH UR STRIP: 7 [PH] (ref 5–8)
PLATELET # BLD AUTO: 223 K/UL (ref 150–350)
PLATELET BLD QL SMEAR: ABNORMAL
PMV BLD AUTO: 10 FL (ref 9.2–12.9)
POTASSIUM SERPL-SCNC: 3.5 MMOL/L (ref 3.5–5.1)
PROCALCITONIN SERPL IA-MCNC: 0.17 NG/ML
PROT SERPL-MCNC: 6.3 G/DL (ref 6–8.4)
PROT UR QL STRIP: NEGATIVE
RBC # BLD AUTO: 5.2 M/UL (ref 4.6–6.2)
SODIUM SERPL-SCNC: 135 MMOL/L (ref 136–145)
SP GR UR STRIP: 1.01 (ref 1–1.03)
URN SPEC COLLECT METH UR: ABNORMAL
UROBILINOGEN UR STRIP-ACNC: NEGATIVE EU/DL
WBC # BLD AUTO: 13.29 K/UL (ref 3.9–12.7)

## 2019-09-10 PROCEDURE — S0077 INJECTION, CLINDAMYCIN PHOSP: HCPCS | Performed by: PHYSICIAN ASSISTANT

## 2019-09-10 PROCEDURE — 63600175 PHARM REV CODE 636 W HCPCS: Performed by: NURSE PRACTITIONER

## 2019-09-10 PROCEDURE — 63600175 PHARM REV CODE 636 W HCPCS: Performed by: HOSPITALIST

## 2019-09-10 PROCEDURE — G0378 HOSPITAL OBSERVATION PER HR: HCPCS

## 2019-09-10 PROCEDURE — 25000003 PHARM REV CODE 250: Performed by: HOSPITALIST

## 2019-09-10 PROCEDURE — 96361 HYDRATE IV INFUSION ADD-ON: CPT

## 2019-09-10 PROCEDURE — 96365 THER/PROPH/DIAG IV INF INIT: CPT | Mod: 59

## 2019-09-10 PROCEDURE — 85652 RBC SED RATE AUTOMATED: CPT

## 2019-09-10 PROCEDURE — 81003 URINALYSIS AUTO W/O SCOPE: CPT

## 2019-09-10 PROCEDURE — 96367 TX/PROPH/DG ADDL SEQ IV INF: CPT

## 2019-09-10 PROCEDURE — 85025 COMPLETE CBC W/AUTO DIFF WBC: CPT

## 2019-09-10 PROCEDURE — 25000003 PHARM REV CODE 250: Performed by: PHYSICIAN ASSISTANT

## 2019-09-10 PROCEDURE — 96372 THER/PROPH/DIAG INJ SC/IM: CPT

## 2019-09-10 PROCEDURE — 96366 THER/PROPH/DIAG IV INF ADDON: CPT

## 2019-09-10 PROCEDURE — 84145 PROCALCITONIN (PCT): CPT

## 2019-09-10 PROCEDURE — 96365 THER/PROPH/DIAG IV INF INIT: CPT

## 2019-09-10 PROCEDURE — 80053 COMPREHEN METABOLIC PANEL: CPT

## 2019-09-10 PROCEDURE — 99285 EMERGENCY DEPT VISIT HI MDM: CPT | Mod: 25

## 2019-09-10 PROCEDURE — 86140 C-REACTIVE PROTEIN: CPT

## 2019-09-10 PROCEDURE — 87040 BLOOD CULTURE FOR BACTERIA: CPT | Mod: 59

## 2019-09-10 PROCEDURE — 96376 TX/PRO/DX INJ SAME DRUG ADON: CPT

## 2019-09-10 PROCEDURE — 83605 ASSAY OF LACTIC ACID: CPT

## 2019-09-10 PROCEDURE — 63600175 PHARM REV CODE 636 W HCPCS: Performed by: PHYSICIAN ASSISTANT

## 2019-09-10 RX ORDER — ONDANSETRON 2 MG/ML
4 INJECTION INTRAMUSCULAR; INTRAVENOUS EVERY 8 HOURS PRN
Status: DISCONTINUED | OUTPATIENT
Start: 2019-09-10 | End: 2019-09-12 | Stop reason: HOSPADM

## 2019-09-10 RX ORDER — ACETAMINOPHEN 325 MG/1
650 TABLET ORAL EVERY 4 HOURS PRN
Status: DISCONTINUED | OUTPATIENT
Start: 2019-09-10 | End: 2019-09-12 | Stop reason: HOSPADM

## 2019-09-10 RX ORDER — HYDROCODONE BITARTRATE AND ACETAMINOPHEN 5; 325 MG/1; MG/1
1 TABLET ORAL EVERY 6 HOURS PRN
Status: DISCONTINUED | OUTPATIENT
Start: 2019-09-10 | End: 2019-09-12 | Stop reason: HOSPADM

## 2019-09-10 RX ORDER — CEFAZOLIN SODIUM 2 G/50ML
2 SOLUTION INTRAVENOUS
Status: DISCONTINUED | OUTPATIENT
Start: 2019-09-10 | End: 2019-09-10

## 2019-09-10 RX ORDER — ACETAMINOPHEN 500 MG
1000 TABLET ORAL
Status: COMPLETED | OUTPATIENT
Start: 2019-09-10 | End: 2019-09-10

## 2019-09-10 RX ORDER — ENOXAPARIN SODIUM 100 MG/ML
40 INJECTION SUBCUTANEOUS EVERY 24 HOURS
Status: DISCONTINUED | OUTPATIENT
Start: 2019-09-10 | End: 2019-09-12 | Stop reason: HOSPADM

## 2019-09-10 RX ORDER — CLINDAMYCIN PHOSPHATE 900 MG/50ML
900 INJECTION, SOLUTION INTRAVENOUS
Status: COMPLETED | OUTPATIENT
Start: 2019-09-10 | End: 2019-09-10

## 2019-09-10 RX ORDER — IBUPROFEN 400 MG/1
800 TABLET ORAL
Status: COMPLETED | OUTPATIENT
Start: 2019-09-10 | End: 2019-09-10

## 2019-09-10 RX ORDER — SODIUM CHLORIDE 9 MG/ML
1000 INJECTION, SOLUTION INTRAVENOUS CONTINUOUS
Status: ACTIVE | OUTPATIENT
Start: 2019-09-10 | End: 2019-09-11

## 2019-09-10 RX ADMIN — SODIUM CHLORIDE 1000 ML: 0.9 INJECTION, SOLUTION INTRAVENOUS at 01:09

## 2019-09-10 RX ADMIN — ENOXAPARIN SODIUM 40 MG: 100 INJECTION SUBCUTANEOUS at 08:09

## 2019-09-10 RX ADMIN — PIPERACILLIN AND TAZOBACTAM 4.5 G: 4; .5 INJECTION, POWDER, LYOPHILIZED, FOR SOLUTION INTRAVENOUS; PARENTERAL at 11:09

## 2019-09-10 RX ADMIN — SODIUM CHLORIDE 1000 ML: 0.9 INJECTION, SOLUTION INTRAVENOUS at 02:09

## 2019-09-10 RX ADMIN — VANCOMYCIN HYDROCHLORIDE 2000 MG: 100 INJECTION, POWDER, LYOPHILIZED, FOR SOLUTION INTRAVENOUS at 04:09

## 2019-09-10 RX ADMIN — CEFAZOLIN SODIUM 2 G: 2 SOLUTION INTRAVENOUS at 08:09

## 2019-09-10 RX ADMIN — IBUPROFEN 800 MG: 400 TABLET, FILM COATED ORAL at 06:09

## 2019-09-10 RX ADMIN — ACETAMINOPHEN 650 MG: 325 TABLET ORAL at 09:09

## 2019-09-10 RX ADMIN — SODIUM CHLORIDE 1000 ML: 0.9 INJECTION, SOLUTION INTRAVENOUS at 11:09

## 2019-09-10 RX ADMIN — CLINDAMYCIN IN 5 PERCENT DEXTROSE 900 MG: 18 INJECTION, SOLUTION INTRAVENOUS at 01:09

## 2019-09-10 RX ADMIN — PIPERACILLIN AND TAZOBACTAM 4.5 G: 4; .5 INJECTION, POWDER, LYOPHILIZED, FOR SOLUTION INTRAVENOUS; PARENTERAL at 03:09

## 2019-09-10 RX ADMIN — ACETAMINOPHEN 1000 MG: 500 TABLET ORAL at 01:09

## 2019-09-10 RX ADMIN — HYDROCODONE BITARTRATE AND ACETAMINOPHEN 1 TABLET: 5; 325 TABLET ORAL at 11:09

## 2019-09-10 NOTE — ED NOTES
Family member called this RN to room, pt reports pain to IV upon sitting up. Partial removal of IV noted with swelling to site. IV removed completely, catheter intact. Sterile 2x2 and coban secured to area. Provider called to bedside, assessed site, advised to apply ice [id number]. Pt c/o pain only when site is palpated. Ice applied, pt tolerating well will continue to monitor.

## 2019-09-10 NOTE — ED NOTES
Treated fever at home with 600 mg of ibuprofen at 5 pm and two tablets of Tylenol cold at 6:20 pm.

## 2019-09-10 NOTE — ED PROVIDER NOTES
Encounter Date: 9/9/2019       History     Chief Complaint   Patient presents with    Fever     Patient presents to the ED with reports of having fever of 103.7 approximately x 2 hours ago. Patient states having taken tylenol and advil. Symptoms include body aches and leg pain. Reports having left leg is worse and has swelling.     Generalized Body Aches    Leg Pain    Leg Swelling     I initially evaluated this patient and ordered a workup while in triage.  The patient will receive a full evaluation in the ED when space is available.  All results from tests ordered in triage will not be followed by the intake team, including myself. All results will be followed by the ED Main or [facility] provider.    This is a 28 y.o. male who has a past medical history of Muscular dystrophy who presents with fever and left leg pain.  Pt has left leg pain x1 day, red, hot, mildly swollen. Prior hx of cellulitis.  Pt cleaned his pond 1 week ago.  Symptoms are associated with bilateral low back pain.  Exam remarkable for red, hot anterior left leg, bilateral low back tenderness, no midline tenderness.  Pt denies congestion, SOB, dysuria, chest pain, abdominal pain or flu-like symptoms.  Workup for cellulitis/bacteremiaincluding basic labs, blood culture, lactate, influenza, lactated Ringer's bolus, clindamycin.    The history is provided by the patient.       I, Dr. Ta Pearson, personally performed the services described in this documentation.   All medical record entries made by the scribe were at my direction and in my presence.   I have reviewed the chart and agree that the record is accurate and complete.   Ta Pearson MD.                       Ta Pearson MD  09/10/19 [id number]

## 2019-09-10 NOTE — ED PROVIDER NOTES
Encounter Date: 9/10/2019    SCRIBE #1 NOTE: IMarcello, lexa scribing for, and in the presence of,  Conchita Negron PA-C. I have scribed the entire note.       History     Chief Complaint   Patient presents with    Cellulitis     LLE redness and swelling since yesterday with no h/o injury. States h/o cellulitis in same extremity 2 years ago. Reports fever at home. On arrival, awake, alert, oriented. LLE edematous with c/o tightness in lower leg.      Patient is a 28 year-old male with PMHx Muscular dystrophy presents to the ED for evaluation of left leg pain. Patient reports LLE swelling with pain and redness since yesterday morning. His pain is worse with weight bearing. Patient has prior history of cellulitis. He had a max fever of 103.7 F last night. Patient has been taking Tylenol for his symptoms which was last taken at 7 AM this morning. He admits to chills but denies any abdominal pain, vomiting, diarrhea, sore throat, rhinorrhea, urinary symptoms, or recent sick contacts. Reports he was cleaning out the pond a week ago but did not have any problems at that time. He denies history of MRSA. Patient was placed on antibiotics the last time this occurred 2 years ago. Patient was seen in the ER yesterday but eloped because he did not want to wait any longer.  No recent hospitalizations.  Patient reports being able to ambulate on his own at home.    The history is provided by the patient and a relative.     Review of patient's allergies indicates:  No Known Allergies  Past Medical History:   Diagnosis Date    Cellulitis     Muscular dystrophy      Past Surgical History:   Procedure Laterality Date    CYST REMOVAL      HERNIA REPAIR       Family History   Problem Relation Age of Onset    No Known Problems Mother     No Known Problems Father      Social History     Tobacco Use    Smoking status: Current Every Day Smoker     Packs/day: 0.75     Years: 17.00     Pack years: 12.75     Types: Cigarettes      Start date: 2002    Smokeless tobacco: Never Used    Tobacco comment: Pt referred to 1-800-QUIT-NOW smoking cessation program   Substance Use Topics    Alcohol use: No    Drug use: No     Review of Systems   Constitutional: Positive for chills and fever.   HENT: Negative for congestion.    Respiratory: Negative for cough and shortness of breath.    Cardiovascular: Negative for chest pain.   Gastrointestinal: Negative for abdominal pain, diarrhea and vomiting.   Genitourinary: Negative for dysuria, flank pain and hematuria.   Musculoskeletal: Negative for arthralgias and joint swelling.   Skin: Positive for color change and rash. Negative for wound.   Allergic/Immunologic: Negative for immunocompromised state.   Neurological: Negative for dizziness and weakness.   Hematological: Does not bruise/bleed easily.   Psychiatric/Behavioral: Negative for confusion.   All other systems reviewed and are negative.      Physical Exam     Initial Vitals [09/10/19 1105]   BP Pulse Resp Temp SpO2   107/68 105 (!) 22 99.5 °F (37.5 °C) 98 %      MAP       --         Physical Exam    Vitals reviewed.  Constitutional: He appears well-developed and well-nourished. He is not diaphoretic. No distress.   HENT:   Head: Normocephalic and atraumatic.   Eyes: Conjunctivae and EOM are normal.   Neck: Neck supple.   Cardiovascular: Normal rate, regular rhythm, normal heart sounds and intact distal pulses.   Pulmonary/Chest: Breath sounds normal. No respiratory distress. He has no wheezes.   Neurological: He is alert and oriented to person, place, and time. He has normal strength.   Skin: Skin is warm. There is erythema (Large area of erythema to anterior aspect of LLE, mild tenderness on palpation. No abscess, crepitus, or necrosis).         ED Course   ED US Procedure Guidance  Date/Time: 9/10/2019 3:21 PM  Performed by: Alissa Tejada MD  Authorized by: Alissa Tejada MD   Comments: Peripheral IV placed to left upper extremity with  20 gauge needle using sterile U/S guidance.  IV taped in place.        Labs Reviewed   CBC W/ AUTO DIFFERENTIAL - Abnormal; Notable for the following components:       Result Value    WBC 13.29 (*)     Gran # (ANC) 11.2 (*)     Gran% 85.0 (*)     Lymph% 8.0 (*)     All other components within normal limits   COMPREHENSIVE METABOLIC PANEL - Abnormal; Notable for the following components:    Sodium 135 (*)     CO2 18 (*)     Glucose 111 (*)     Calcium 8.2 (*)     Albumin 3.3 (*)     Alkaline Phosphatase 52 (*)     All other components within normal limits   URINALYSIS, REFLEX TO URINE CULTURE - Abnormal; Notable for the following components:    Occult Blood UA Trace (*)     All other components within normal limits    Narrative:     Preferred Collection Type->Urine, Clean Catch   C-REACTIVE PROTEIN - Abnormal; Notable for the following components:    .2 (*)     All other components within normal limits   SEDIMENTATION RATE - Abnormal; Notable for the following components:    Sed Rate 12 (*)     All other components within normal limits   LACTIC ACID, PLASMA   PROCALCITONIN   PROCALCITONIN   C-REACTIVE PROTEIN   SEDIMENTATION RATE   LACTIC ACID, PLASMA          Imaging Results          X-Ray Tibia Fibula 2 View Left (Final result)  Result time 09/10/19 16:25:21   Procedure changed from X-Ray Tibia Fibula 2 View Right     Final result by Asael Urbina MD (09/10/19 16:25:21)                 Impression:      No fracture or malalignment.  There is mild diffuse skin edema as could be seen with cellulitis.      Electronically signed by: Asael Urbina  Date:    09/10/2019  Time:    16:25             Narrative:    EXAMINATION:  XR TIBIA FIBULA 2 VIEW LEFT    CLINICAL HISTORY:  cellulitis;  Cellulitis of left lower limb    TECHNIQUE:  AP and lateral views of the left tibia and fibula were performed.    COMPARISON:  10/27/2016    FINDINGS:  Frontal lateral view with 3 images presented.  Mineralization and alignment  and joint spaces are normal.  No fracture or erosion or periosteal reaction.  There is fatty replacement evident of the calf musculature probably involving the gastrocs soleus muscle similar with 10/27/2016.  No soft tissue defect or foreign body.  The Achilles tendon appears normal.  No soft tissue gas.  There is mild diffuse skin edema.                                 Medical Decision Making:   Clinical Tests:   Lab Tests: Reviewed and Ordered  Radiological Study: Ordered and Reviewed       APC / Resident Notes:   Patient seen in the ER promptly upon arrival.  He is afebrile, no acute distress. Physical examination reveals erythema to the anterior aspect of the left lower extremity.  He does have some tenderness on palpation throughout.  No abscess formation, crepitus or necrosis at this time.  IV access was established, labs ordered.  Fluids were given.  Blood cultures obtained, pending results Patient initiated on IV clindamycin.    Laboratory studies show leukocytosis of 13.2.  Hemoglobin is stable. Chemistries were unremarkable. Lactic acid elevated to 2.2.  Repeat lactic acid pending.  Procalcitonin 0.17.  CRP is markedly elevated to 172. ESR 12.  X-ray of tib-fib reveals diffuse skin edema consistent with cellulitis otherwise unremarkable.    During patient's stay in the ED he developed high-grade fever of 103.9.  He was given Tylenol.  Given that patient is now meeting sepsis criteria at [facility] and vancomycin was initiated.    Upon reassessment patient remains tachycardic but temperature has improved.  He was informed of the decision for admission, acknowledges and agrees treatment plan.  There was some delay in obtaining lab work and admission due to multiple difficult IV access.  [facility]  Patient has been stable during his stay in the ED and stable for transfer to the floor at this time. The care of this patient was overseen by attending  physician who agrees with treatment, plan, and disposition.           Attending Attestation:     Physician Attestation Statement for NP/PA:   I have conducted a face to face encounter with this patient in addition to the NP/PA, due to NP/PA Request    Other NP/PA Attestation Additions:    History of Present Illness: Patient here with LLE erythema, swelling, pain.  + fever and chills.  Denies injury or break in skin   Physical Exam: Patient is febrile and tachycardic.  A&Ox4,  + shaking rigors.  Large area of erythema to anterior left lower leg consistent with cellulitis.  No fluctuance, crepitus, necrosis.  2+ PT and DP pulse to LLE.  + diffuse TTP to LLE.    Medical Decision Making: Peripheral IV placed by me under US guidance.  Suspect cellulitis. Patient also meeting SIRS criteria.   + leukocytosis.  Given abx and IVFs.  Will admit for further management.                   ED Course as of Sep 12 0919   Tue Sep 10, 2019   1544 Discussed with Dr Bull.  Will call back once labs have resulted.    [LD]   1545 BP: 133/85 [LD]   1545 Temp(!): 103 °F (39.4 °C) [LD]   1545 Pulse(!): 113 [LD]   1545 Resp(!): 24 [LD]   1545 SpO2: 100 % [LD]      ED Course User Index  [LD] Alissa Tejada MD     Vitals:    09/12/19 0832   BP: 106/67   Pulse: 85   Resp: 18   Temp: 98.3 °F (36.8 °C)       Clinical Impression:       ICD-10-CM ICD-9-CM   1. Cellulitis of left leg L03.116 682.6   2. Sepsis, due to unspecified organism A41.9 038.9     995.91           Disposition:   Disposition: Discharged  Condition: Stable       IAlissa personally performed the services described in this documentation. All medical record entries made by the scribe were at my direction and in my presence.  I have reviewed the chart and agree that the record reflects my personal performance and is accurate and complete. Conchita Negron PA-C  12:33 PM 09/12/2019                   Conchita Negron PA-C  09/10/19 1815       Alissa Tejada,  MD  09/12/19 0919

## 2019-09-11 ENCOUNTER — CLINICAL SUPPORT (OUTPATIENT)
Dept: SMOKING CESSATION | Facility: CLINIC | Age: 29
End: 2019-09-11

## 2019-09-11 DIAGNOSIS — F17.210 CIGARETTE SMOKER: Primary | ICD-10-CM

## 2019-09-11 PROBLEM — E83.39 HYPOPHOSPHATEMIA: Status: ACTIVE | Noted: 2019-09-11

## 2019-09-11 PROBLEM — E87.6 HYPOKALEMIA: Status: ACTIVE | Noted: 2019-09-11

## 2019-09-11 LAB
ALBUMIN SERPL BCP-MCNC: 3 G/DL (ref 3.5–5.2)
ALP SERPL-CCNC: 55 U/L (ref 55–135)
ALT SERPL W/O P-5'-P-CCNC: 24 U/L (ref 10–44)
ANION GAP SERPL CALC-SCNC: 5 MMOL/L (ref 8–16)
AST SERPL-CCNC: 17 U/L (ref 10–40)
BILIRUB DIRECT SERPL-MCNC: 0.2 MG/DL (ref 0.1–0.3)
BILIRUB SERPL-MCNC: 0.4 MG/DL (ref 0.1–1)
BUN SERPL-MCNC: 7 MG/DL (ref 6–20)
CALCIUM SERPL-MCNC: 8.2 MG/DL (ref 8.7–10.5)
CHLORIDE SERPL-SCNC: 108 MMOL/L (ref 95–110)
CO2 SERPL-SCNC: 26 MMOL/L (ref 23–29)
CREAT SERPL-MCNC: 0.5 MG/DL (ref 0.5–1.4)
ERYTHROCYTE [DISTWIDTH] IN BLOOD BY AUTOMATED COUNT: 13.6 % (ref 11.5–14.5)
EST. GFR  (AFRICAN AMERICAN): >60 ML/MIN/1.73 M^2
EST. GFR  (NON AFRICAN AMERICAN): >60 ML/MIN/1.73 M^2
ESTIMATED AVG GLUCOSE: 103 MG/DL (ref 68–131)
GLUCOSE SERPL-MCNC: 132 MG/DL (ref 70–110)
HBA1C MFR BLD HPLC: 5.2 % (ref 4–5.6)
HCT VFR BLD AUTO: 40.3 % (ref 40–54)
HGB BLD-MCNC: 13.3 G/DL (ref 14–18)
INR PPP: 1 (ref 0.8–1.2)
MAGNESIUM SERPL-MCNC: 2.2 MG/DL (ref 1.6–2.6)
MCH RBC QN AUTO: 29.5 PG (ref 27–31)
MCHC RBC AUTO-ENTMCNC: 33 G/DL (ref 32–36)
MCV RBC AUTO: 89 FL (ref 82–98)
PHOSPHATE SERPL-MCNC: 2.5 MG/DL (ref 2.7–4.5)
PLATELET # BLD AUTO: 185 K/UL (ref 150–350)
PMV BLD AUTO: 9.5 FL (ref 9.2–12.9)
POTASSIUM SERPL-SCNC: 3.4 MMOL/L (ref 3.5–5.1)
PROT SERPL-MCNC: 5.8 G/DL (ref 6–8.4)
PROTHROMBIN TIME: 10.4 SEC (ref 9–12.5)
RBC # BLD AUTO: 4.51 M/UL (ref 4.6–6.2)
SODIUM SERPL-SCNC: 139 MMOL/L (ref 136–145)
WBC # BLD AUTO: 9.12 K/UL (ref 3.9–12.7)

## 2019-09-11 PROCEDURE — 80048 BASIC METABOLIC PNL TOTAL CA: CPT

## 2019-09-11 PROCEDURE — 96375 TX/PRO/DX INJ NEW DRUG ADDON: CPT

## 2019-09-11 PROCEDURE — 99406 PT REFUSED TOBACCO CESSATION: ICD-10-PCS | Mod: S$GLB,,,

## 2019-09-11 PROCEDURE — S4991 NICOTINE PATCH NONLEGEND: HCPCS | Performed by: FAMILY MEDICINE

## 2019-09-11 PROCEDURE — 84100 ASSAY OF PHOSPHORUS: CPT

## 2019-09-11 PROCEDURE — 83735 ASSAY OF MAGNESIUM: CPT

## 2019-09-11 PROCEDURE — G0378 HOSPITAL OBSERVATION PER HR: HCPCS

## 2019-09-11 PROCEDURE — 63600175 PHARM REV CODE 636 W HCPCS: Performed by: NURSE PRACTITIONER

## 2019-09-11 PROCEDURE — 96372 THER/PROPH/DIAG INJ SC/IM: CPT

## 2019-09-11 PROCEDURE — 96376 TX/PRO/DX INJ SAME DRUG ADON: CPT

## 2019-09-11 PROCEDURE — 94761 N-INVAS EAR/PLS OXIMETRY MLT: CPT

## 2019-09-11 PROCEDURE — 25000003 PHARM REV CODE 250: Performed by: HOSPITALIST

## 2019-09-11 PROCEDURE — 80076 HEPATIC FUNCTION PANEL: CPT

## 2019-09-11 PROCEDURE — 85027 COMPLETE CBC AUTOMATED: CPT

## 2019-09-11 PROCEDURE — 83036 HEMOGLOBIN GLYCOSYLATED A1C: CPT

## 2019-09-11 PROCEDURE — 99999 PR PBB SHADOW E&M-EST. PATIENT-LVL I: CPT | Mod: PBBFAC,,,

## 2019-09-11 PROCEDURE — 99406 BEHAV CHNG SMOKING 3-10 MIN: CPT | Mod: S$GLB,,,

## 2019-09-11 PROCEDURE — 63600175 PHARM REV CODE 636 W HCPCS: Performed by: HOSPITALIST

## 2019-09-11 PROCEDURE — 85610 PROTHROMBIN TIME: CPT

## 2019-09-11 PROCEDURE — 99999 PR PBB SHADOW E&M-EST. PATIENT-LVL I: ICD-10-PCS | Mod: PBBFAC,,,

## 2019-09-11 PROCEDURE — 25000003 PHARM REV CODE 250: Performed by: FAMILY MEDICINE

## 2019-09-11 RX ORDER — MORPHINE SULFATE 2 MG/ML
2 INJECTION, SOLUTION INTRAMUSCULAR; INTRAVENOUS ONCE
Status: COMPLETED | OUTPATIENT
Start: 2019-09-11 | End: 2019-09-11

## 2019-09-11 RX ORDER — SODIUM CHLORIDE 0.9 % (FLUSH) 0.9 %
10 SYRINGE (ML) INJECTION
Status: DISCONTINUED | OUTPATIENT
Start: 2019-09-11 | End: 2019-09-12 | Stop reason: HOSPADM

## 2019-09-11 RX ORDER — IBUPROFEN 200 MG
1 TABLET ORAL DAILY
Status: DISCONTINUED | OUTPATIENT
Start: 2019-09-11 | End: 2019-09-12 | Stop reason: HOSPADM

## 2019-09-11 RX ADMIN — ACETAMINOPHEN 650 MG: 325 TABLET ORAL at 10:09

## 2019-09-11 RX ADMIN — ENOXAPARIN SODIUM 40 MG: 100 INJECTION SUBCUTANEOUS at 05:09

## 2019-09-11 RX ADMIN — MORPHINE SULFATE 2 MG: 2 INJECTION, SOLUTION INTRAMUSCULAR; INTRAVENOUS at 10:09

## 2019-09-11 RX ADMIN — HYDROCODONE BITARTRATE AND ACETAMINOPHEN 1 TABLET: 5; 325 TABLET ORAL at 06:09

## 2019-09-11 RX ADMIN — ONDANSETRON 4 MG: 2 INJECTION INTRAMUSCULAR; INTRAVENOUS at 09:09

## 2019-09-11 RX ADMIN — PIPERACILLIN AND TAZOBACTAM 4.5 G: 4; .5 INJECTION, POWDER, LYOPHILIZED, FOR SOLUTION INTRAVENOUS; PARENTERAL at 03:09

## 2019-09-11 RX ADMIN — NICOTINE 1 PATCH: 21 PATCH TRANSDERMAL at 12:09

## 2019-09-11 RX ADMIN — PIPERACILLIN AND TAZOBACTAM 4.5 G: 4; .5 INJECTION, POWDER, LYOPHILIZED, FOR SOLUTION INTRAVENOUS; PARENTERAL at 08:09

## 2019-09-11 RX ADMIN — VANCOMYCIN HYDROCHLORIDE 1750 MG: 100 INJECTION, POWDER, LYOPHILIZED, FOR SOLUTION INTRAVENOUS at 04:09

## 2019-09-11 RX ADMIN — HYDROCODONE BITARTRATE AND ACETAMINOPHEN 1 TABLET: 5; 325 TABLET ORAL at 12:09

## 2019-09-11 RX ADMIN — VANCOMYCIN HYDROCHLORIDE 1750 MG: 100 INJECTION, POWDER, LYOPHILIZED, FOR SOLUTION INTRAVENOUS at 07:09

## 2019-09-11 NOTE — ED NOTES
Awake, alert and oriented x 4.  Resp even and unlabored with clear breath sounds noted.  Abd soft and non tender with + BS. Left lower leg noted with redness and warmth to lower leg and foot.  Pt with c/o 8/10 pain to lower leg.  IV site to R AC without redness.  Wife at the bedside.

## 2019-09-11 NOTE — PLAN OF CARE
Problem: Adult Inpatient Plan of Care  Goal: Plan of Care Review  Outcome: Ongoing (interventions implemented as appropriate)  Completed admission.  Initiated care plan and education plan.  Chart review complete.

## 2019-09-11 NOTE — ED NOTES
Care assumed, report received.  Pt connected to cardiac monitor, automatic bp and continuous pulse oximetry.  Pt aao x 4, resp =/unlabored. Speech clear and coherent.  Friend at bedside.  Awaiting phlebotomy for 2nd lactic acid.  Vancomycin infusing via pump to 20 g PIV to right ac, placed by anesthesia.  Site clear.  Continuing to monitor.

## 2019-09-11 NOTE — PLAN OF CARE
Problem: Adult Inpatient Plan of Care  Goal: Plan of Care Review  Outcome: Ongoing (interventions implemented as appropriate)     09/11/19 3263   Plan of Care Review   Plan of Care Reviewed With patient;spouse   0100 pt arrived via wheel chair appeared to be in no distress.  L L Ext red, hot, swollen.  Pt has MS, wife lifted pt from W/C and pivoted pt over to bed.      Tele: SR,  HR 70,  No alarms.     Bed in lowest position, wheels locked, non skid socks, ID band worn, personal items and call bell with in reach, bed alarm set.

## 2019-09-11 NOTE — SUBJECTIVE & OBJECTIVE
Interval History: awake and alert, family by bedside. Patient noted LLE pain is better but   Replace K  Wbc down trending. Blood cx NGTD, continue Vanc and Zosyn and possible de-escalate tomorrow    Review of Systems   Constitutional: Positive for chills and fever. Negative for unexpected weight change.   Respiratory: Negative for cough and shortness of breath.    Cardiovascular: Positive for leg swelling. Negative for chest pain and palpitations.   Gastrointestinal: Negative for abdominal distention, abdominal pain, nausea and vomiting.   Genitourinary: Negative for dysuria, flank pain and urgency.   Musculoskeletal: Positive for gait problem and joint swelling. Negative for back pain.   Skin: Positive for rash. Negative for color change and wound.   Neurological: Positive for weakness. Negative for tremors, seizures and numbness.   Psychiatric/Behavioral: Negative for agitation.     Objective:     Vital Signs (Most Recent):  Temp: 100.3 °F (37.9 °C) (09/11/19 0759)  Pulse: 104 (09/11/19 0759)  Resp: 20 (09/11/19 0759)  BP: 122/78 (09/11/19 0759)  SpO2: 99 % (09/11/19 0746) Vital Signs (24h Range):  Temp:  [98.1 °F (36.7 °C)-103.9 °F (39.9 °C)] 100.3 °F (37.9 °C)  Pulse:  [] 104  Resp:  [19-24] 20  SpO2:  [95 %-100 %] 99 %  BP: ()/(60-89) 122/78     Weight: 117 kg (257 lb 15 oz)  Body mass index is 39.22 kg/m².    Intake/Output Summary (Last 24 hours) at 9/11/2019 1159  Last data filed at 9/11/2019 0600  Gross per 24 hour   Intake 3360 ml   Output --   Net 3360 ml      Physical Exam   Constitutional: He is oriented to person, place, and time. He appears well-developed and well-nourished.   HENT:   Head: Normocephalic and atraumatic.   Neck: Neck supple. No JVD present.   Cardiovascular: Normal rate and regular rhythm.   Pulmonary/Chest: Effort normal and breath sounds normal. No respiratory distress.   Abdominal: Soft. Bowel sounds are normal. He exhibits no distension.   Musculoskeletal:  Normal range of motion. He exhibits edema.   Neurological: He is alert and oriented to person, place, and time.   Skin: Skin is warm and dry. Capillary refill takes less than 2 seconds.   (Large area of erythema to anterior aspect of LLE, mild tenderness on palpation. No abscess, crepitus, or necrosis).    Psychiatric: He has a normal mood and affect.       Significant Labs:   Blood Culture:   Recent Labs   Lab 09/09/19  2153 09/10/19  1243 09/10/19  1330   LABBLOO No Growth to date  No Growth to date No Growth to date No Growth to date     CBC:   Recent Labs   Lab 09/10/19  1332 09/11/19  0602   WBC 13.29* 9.12   HGB 15.4 13.3*   HCT 46.4 40.3    185     CMP:   Recent Labs   Lab 09/09/19  2156 09/10/19  1623 09/11/19  0602   * 135* 139   K 3.5 3.5 3.4*    108 108   CO2 14* 18* 26   GLU 92 111* 132*   BUN 8 6 7   CREATININE 0.6 0.5 0.5   CALCIUM 8.9 8.2* 8.2*   PROT 7.4 6.3 5.8*   ALBUMIN 4.2 3.3* 3.0*   BILITOT 0.6 0.7 0.4   ALKPHOS 67 52* 55   AST 25 23 17   ALT 29 26 24   ANIONGAP 13 9 5*   EGFRNONAA >60 >60 >60     Lactic Acid:   Recent Labs   Lab 09/10/19  1332 09/10/19  2130   LACTATE 2.2 1.9     Lipase: No results for input(s): LIPASE in the last 48 hours.  TSH: No results for input(s): TSH in the last 4320 hours.  Urine Culture: No results for input(s): LABURIN in the last 48 hours.  Urine Studies:   Recent Labs   Lab 09/10/19  1553   COLORU Yellow   APPEARANCEUA Clear   PHUR 7.0   SPECGRAV 1.010   PROTEINUA Negative   GLUCUA Negative   KETONESU Negative   BILIRUBINUA Negative   OCCULTUA Trace*   NITRITE Negative   UROBILINOGEN Negative   LEUKOCYTESUR Negative       Significant Imaging: I have reviewed all pertinent imaging results/findings within the past 24 hours.

## 2019-09-11 NOTE — PLAN OF CARE
Discharge planning brochure provided. White board updated with CM name & contact info.  Pt encouraged to call with any questions or needs. CM will continue to follow patient throughout the transitions of care, and assist with any discharge needs.       09/11/19 1621   Discharge Assessment   Assessment Type Discharge Planning Assessment   Confirmed/corrected address and phone number on facesheet? Yes   Communicated expected length of stay with patient/caregiver yes   Prior to hospitilization cognitive status: Alert/Oriented   Prior to hospitalization functional status: Independent   Current cognitive status: Alert/Oriented   Current Functional Status: Independent   Lives With spouse   Able to Return to Prior Arrangements yes   Is patient able to care for self after discharge? Yes   Who are your caregiver(s) and their phone number(s)?   ([phone])   Readmission Within the Last 30 Days no previous admission in last 30 days   Patient currently being followed by outpatient case management? No   Patient currently receives any other outside agency services? No   Equipment Currently Used at Home none   Do you have any problems affording any of your prescribed medications? No   Is the patient taking medications as prescribed? yes   Does the patient have transportation home? Yes   Transportation Anticipated family or friend will provide   Does the patient receive services at the [facility]? No   Discharge Plan A Home   Discharge Plan B Home with family   DME Needed Upon Discharge  none   Patient/Family in Agreement with Plan yes       Camila Cordoba RN    [phone]

## 2019-09-11 NOTE — ASSESSMENT & PLAN NOTE
Sepsis affecting Skin  Leukocytosis    High grade fever 103.9, tachycardic, labs indicated leulocytosis, elevated lactic acid, elevated CRP, sepsis criteria met, Vanc/Zosyn initated.    Continue Vanc/Zosyn  Fall precautions  Elevated affected extremity  Lovenox 40mg SQ daily  Pain management  Monitor temp  Monitor WBC  Trend lactate

## 2019-09-11 NOTE — PROGRESS NOTES
[facility] Medicine  Progress Note    Patient Name: Chucho Ulloa  MRN: [id number]  Patient Class: OP- Observation   Admission Date: 9/10/2019  Length of Stay: 0 days  Attending Physician: Roxanne Diaz*  Primary Care Provider: Deonna Villegas MD        Subjective:     Principal Problem:Cellulitis of left leg        HPI:   Chucho Ulloa is a 28 year old male with a past medical history of Musclular Dystrophy. He lives in [place], [place] with his wife. His PCP is Dr. Deonna Villegas.     He presented to [facility] ED 9/10/2019 with a chief complaint of redness and swelling to his left lower extremity that started one day prior. He denies recent injury. Associated symptoms include fever and chills. He reports cleaning out the pond a week ago but did not have any problems at that time. He denies history of MRSA. Patient was placed on antibiotics the last time this occurred 2 years ago. Patient was seen in the ER yesterday but eloped because he did not want to wait any longer. ED workup revealed Temp (102), WBC (13.29), Na (135), CO2 (18), Glucose (111), Albumin (3.3), Occult blood UA (trace), CRP (172.2), Sed rate (12), Lacti acid (2.2), Xray left Tib/Fib revealed diffuse skin edema consistent with cellulitis otherwise unremarkable. Sepsis criteria met, Vanc/Zosyn started in ED, Admitted to Hospital Medicine for further evaluation and treatment.     Overview/Hospital Course:  Swelling/ erythema stable, continue abx    Interval History: awake and alert, family by bedside. Patient noted LLE pain is better but   Replace K  Wbc down trending. Blood cx NGTD, continue Vanc and Zosyn and possible de-escalate tomorrow    Review of Systems   Constitutional: Positive for chills and fever. Negative for unexpected weight change.   Respiratory: Negative for cough and shortness of breath.    Cardiovascular: Positive for leg swelling. Negative for chest pain and palpitations.    Gastrointestinal: Negative for abdominal distention, abdominal pain, nausea and vomiting.   Genitourinary: Negative for dysuria, flank pain and urgency.   Musculoskeletal: Positive for gait problem and joint swelling. Negative for back pain.   Skin: Positive for rash. Negative for color change and wound.   Neurological: Positive for weakness. Negative for tremors, seizures and numbness.   Psychiatric/Behavioral: Negative for agitation.     Objective:     Vital Signs (Most Recent):  Temp: 100.3 °F (37.9 °C) (09/11/19 0759)  Pulse: 104 (09/11/19 0759)  Resp: 20 (09/11/19 0759)  BP: 122/78 (09/11/19 0759)  SpO2: 99 % (09/11/19 0746) Vital Signs (24h Range):  Temp:  [98.1 °F (36.7 °C)-103.9 °F (39.9 °C)] 100.3 °F (37.9 °C)  Pulse:  [] 104  Resp:  [19-24] 20  SpO2:  [95 %-100 %] 99 %  BP: ()/(60-89) 122/78     Weight: 117 kg (257 lb 15 oz)  Body mass index is 39.22 kg/m².    Intake/Output Summary (Last 24 hours) at 9/11/2019 1159  Last data filed at 9/11/2019 0600  Gross per 24 hour   Intake 3360 ml   Output --   Net 3360 ml      Physical Exam   Constitutional: He is oriented to person, place, and time. He appears well-developed and well-nourished.   HENT:   Head: Normocephalic and atraumatic.   Neck: Neck supple. No JVD present.   Cardiovascular: Normal rate and regular rhythm.   Pulmonary/Chest: Effort normal and breath sounds normal. No respiratory distress.   Abdominal: Soft. Bowel sounds are normal. He exhibits no distension.   Musculoskeletal: Normal range of motion. He exhibits edema.   Neurological: He is alert and oriented to person, place, and time.   Skin: Skin is warm and dry. Capillary refill takes less than 2 seconds.   (Large area of erythema to anterior aspect of LLE, mild tenderness on palpation. No abscess, crepitus, or necrosis).    Psychiatric: He has a normal mood and affect.       Significant Labs:   Blood Culture:   Recent Labs   Lab 09/09/19  5643 09/10/19  1243 09/10/19  0566    LABBLOO No Growth to date  No Growth to date No Growth to date No Growth to date     CBC:   Recent Labs   Lab 09/10/19  1332 09/11/19  0602   WBC 13.29* 9.12   HGB 15.4 13.3*   HCT 46.4 40.3    185     CMP:   Recent Labs   Lab 09/09/19  2156 09/10/19  1623 09/11/19  0602   * 135* 139   K 3.5 3.5 3.4*    108 108   CO2 14* 18* 26   GLU 92 111* 132*   BUN 8 6 7   CREATININE 0.6 0.5 0.5   CALCIUM 8.9 8.2* 8.2*   PROT 7.4 6.3 5.8*   ALBUMIN 4.2 3.3* 3.0*   BILITOT 0.6 0.7 0.4   ALKPHOS 67 52* 55   AST 25 23 17   ALT 29 26 24   ANIONGAP 13 9 5*   EGFRNONAA >60 >60 >60     Lactic Acid:   Recent Labs   Lab 09/10/19  1332 09/10/19  2130   LACTATE 2.2 1.9     Lipase: No results for input(s): LIPASE in the last 48 hours.  TSH: No results for input(s): TSH in the last 4320 hours.  Urine Culture: No results for input(s): LABURIN in the last 48 hours.  Urine Studies:   Recent Labs   Lab 09/10/19  1553   COLORU Yellow   APPEARANCEUA Clear   PHUR 7.0   SPECGRAV 1.010   PROTEINUA Negative   GLUCUA Negative   KETONESU Negative   BILIRUBINUA Negative   OCCULTUA Trace*   NITRITE Negative   UROBILINOGEN Negative   LEUKOCYTESUR Negative       Significant Imaging: I have reviewed all pertinent imaging results/findings within the past 24 hours.      Assessment/Plan:      * Cellulitis of left leg  Sepsis affecting Skin  Leukocytosis    High grade fever 103.9, tachycardic, labs indicated leulocytosis, elevated lactic acid, elevated CRP, sepsis criteria met, Vanc/Zosyn initated.    Continue Vanc/Zosyn  Fall precautions  Elevated affected extremity  Lovenox 40mg SQ daily  Pain management  Monitor temp  Monitor WBC  Trend lactate     Hypophosphatemia    replace    Hypokalemia  replace      Muscular dystrophy  Chronic. Noted      Hyponatremia  Na (135)  NS 125ml/hr  Monitor electrolytes         VTE Risk Mitigation (From admission, onward)        Ordered     IP VTE HIGH RISK PATIENT  Once      09/11/19 0102     enoxaparin  injection 40 mg  Daily      09/10/19 1911                Roxanne N FritzocentMD Kevin  Department of Hospital Medicine   [facility]

## 2019-09-11 NOTE — HPI
Chucho Ulloa is a 28 year old male with a past medical history of Musclular Dystrophy. He lives in [place], [place] with his wife. His PCP is Dr. Deonna Villegas.     He presented to [facility] ED 9/10/2019 with a chief complaint of redness and swelling to his left lower extremity that started one day prior. He denies recent injury. Associated symptoms include fever and chills. He reports cleaning out the pond a week ago but did not have any problems at that time. He denies history of MRSA. Patient was placed on antibiotics the last time this occurred 2 years ago. Patient was seen in the ER yesterday but eloped because he did not want to wait any longer. ED workup revealed Temp (102), WBC (13.29), Na (135), CO2 (18), Glucose (111), Albumin (3.3), Occult blood UA (trace), CRP (172.2), Sed rate (12), Lacti acid (2.2), Xray left Tib/Fib revealed diffuse skin edema consistent with cellulitis otherwise unremarkable. Sepsis criteria met, Vanc/Zosyn started in ED, Admitted to Hospital Medicine for further evaluation and treatment.

## 2019-09-11 NOTE — PROGRESS NOTES
Pharmacokinetic Initial Assessment: IV Vancomycin    Assessment/Plan:    Initiate intravenous vancomycin with loading dose of 2000 mg once followed by a maintenance dose of vancomycin 1750mg IV every 12 hours  Desired empiric serum trough concentration is 10 to 20 mcg/mL  Draw vancomycin trough level 30 min prior to fourth dose on 9/12/19 at approximately 0400.   Pharmacy will continue to follow and monitor vancomycin.      Please contact pharmacy at extension [phone] with any questions regarding this assessment.     Thank you for the consult,   Bobby Beck       Patient brief summary:  Chucho Ulloa is a 28 y.o. male initiated on antimicrobial therapy with IV Vancomycin for treatment of suspected skin & soft tissue infection    Drug Allergies:   Review of patient's allergies indicates:  No Known Allergies    Actual Body Weight:   108.9 kg    Renal Function:   Estimated Creatinine Clearance: 263.2 mL/min (based on SCr of 0.5 mg/dL).,     Dialysis Method (if applicable):  N/A    CBC (last 72 hours):  Recent Labs   Lab Result Units 09/10/19  1332   WBC K/uL 13.29*   Hemoglobin g/dL 15.4   Hematocrit % 46.4   Platelets K/uL 223   Gran% % 85.0*   Lymph% % 8.0*   Mono% % 6.7   Eosinophil% % 0.1   Basophil% % 0.2   Differential Method  Automated       Metabolic Panel (last 72 hours):  Recent Labs   Lab Result Units 09/09/19  2156 09/10/19  1553 09/10/19  1623   Sodium mmol/L 135*  --  135*   Potassium mmol/L 3.5  --  3.5   Chloride mmol/L 108  --  108   CO2 mmol/L 14*  --  18*   Glucose mg/dL 92  --  111*   Glucose, UA   --  Negative  --    BUN, Bld mg/dL 8  --  6   Creatinine mg/dL 0.6  --  0.5   Albumin g/dL 4.2  --  3.3*   Total Bilirubin mg/dL 0.6  --  0.7   Alkaline Phosphatase U/L 67  --  52*   AST U/L 25  --  23   ALT U/L 29  --  26       Drug levels (last 3 results):  No results for input(s): VANCOMYCINRA, VANCOMYCINPE, VANCOMYCINTR in the last 72 hours.    Microbiologic Results:  Microbiology Results (last 7  days)       Procedure Component Value Units Date/Time    Blood culture #1 **CANNOT BE ORDERED STAT** [[id number]] Collected:  09/10/19 1243    Order Status:  Sent Specimen:  Blood from Peripheral, Antecubital, Left Updated:  09/10/19 1640    Blood culture #2 **CANNOT BE ORDERED STAT** [[id number]] Collected:  09/10/19 1330    Order Status:  Sent Specimen:  Blood from Peripheral, Upper Arm, Left Updated:  09/10/19 1640

## 2019-09-11 NOTE — SUBJECTIVE & OBJECTIVE
Past Medical History:   Diagnosis Date    Cellulitis     Muscular dystrophy        Past Surgical History:   Procedure Laterality Date    CYST REMOVAL      HERNIA REPAIR         Review of patient's allergies indicates:  No Known Allergies    Current Facility-Administered Medications on File Prior to Encounter   Medication    [DISCONTINUED] clindamycin 600 MG/50 ML D5W 600 mg/50 mL IVPB 600 mg    [DISCONTINUED] lactated ringers bolus 1,000 mL     No current outpatient medications on file prior to encounter.     Family History     Problem Relation (Age of Onset)    No Known Problems Mother, Father        Tobacco Use    Smoking status: Current Every Day Smoker     Packs/day: 0.50     Types: Cigarettes    Smokeless tobacco: Never Used   Substance and Sexual Activity    Alcohol use: No    Drug use: No    Sexual activity: Not on file     Review of Systems   Constitutional: Positive for chills and fever. Negative for unexpected weight change.   HENT: Negative for congestion, facial swelling and nosebleeds.    Eyes: Negative for visual disturbance.   Respiratory: Negative for cough, chest tightness and shortness of breath.    Cardiovascular: Positive for leg swelling. Negative for chest pain and palpitations.   Gastrointestinal: Negative for abdominal distention, abdominal pain, nausea and vomiting.   Endocrine: Negative for polydipsia and polyuria.   Genitourinary: Negative for dysuria, flank pain and urgency.   Musculoskeletal: Positive for gait problem and joint swelling. Negative for back pain.   Skin: Positive for rash. Negative for color change and wound.   Allergic/Immunologic: Negative for food allergies.   Neurological: Positive for weakness. Negative for tremors, seizures, numbness and headaches.   Psychiatric/Behavioral: Negative for agitation.     Objective:     Vital Signs (Most Recent):  Temp: (!) 102.2 °F (39 °C) (09/10/19 2044)  Pulse: 96 (09/10/19 2002)  Resp: 19 (09/10/19 2002)  BP: 121/69  (09/10/19 2002)  SpO2: 100 % (09/10/19 2002) Vital Signs (24h Range):  Temp:  [99.4 °F (37.4 °C)-103.9 °F (39.9 °C)] 102.2 °F (39 °C)  Pulse:  [] 96  Resp:  [19-24] 19  SpO2:  [98 %-100 %] 100 %  BP: (107-172)/(63-89) 121/69     Weight: 108.9 kg (240 lb)  Body mass index is 36.49 kg/m².    Physical Exam   Constitutional: He is oriented to person, place, and time. He appears well-developed and well-nourished.   HENT:   Head: Normocephalic and atraumatic.   Eyes: Pupils are equal, round, and reactive to light.   Neck: Normal range of motion. No JVD present.   Cardiovascular: Normal rate and regular rhythm.   Pulmonary/Chest: Effort normal and breath sounds normal. No respiratory distress.   Abdominal: Soft. Bowel sounds are normal. He exhibits no distension.   Musculoskeletal: Normal range of motion. He exhibits edema.   Neurological: He is alert and oriented to person, place, and time.   Skin: Skin is warm and dry. Capillary refill takes less than 2 seconds.   (Large area of erythema to anterior aspect of LLE, mild tenderness on palpation. No abscess, crepitus, or necrosis).    Psychiatric: He has a normal mood and affect.         CRANIAL NERVES     CN III, IV, VI   Pupils are equal, round, and reactive to light.       Significant Labs:   Bilirubin:   Recent Labs   Lab 09/09/19  2156 09/10/19  1623   BILITOT 0.6 0.7     Blood Culture:   Recent Labs   Lab 09/09/19 2153   LABBLOO No Growth to date     BMP:   Recent Labs   Lab 09/10/19  1623   *   *   K 3.5      CO2 18*   BUN 6   CREATININE 0.5   CALCIUM 8.2*     CBC:   Recent Labs   Lab 09/10/19  1332   WBC 13.29*   HGB 15.4   HCT 46.4        CMP:   Recent Labs   Lab 09/09/19  2156 09/10/19  1623   * 135*   K 3.5 3.5    108   CO2 14* 18*   GLU 92 111*   BUN 8 6   CREATININE 0.6 0.5   CALCIUM 8.9 8.2*   PROT 7.4 6.3   ALBUMIN 4.2 3.3*   BILITOT 0.6 0.7   ALKPHOS 67 52*   AST 25 23   ALT 29 26   ANIONGAP 13 9   EGFRNONAA >60  >60     Lactic Acid:   Recent Labs   Lab 09/10/19  1332   LACTATE 2.2     Urine Studies:   Recent Labs   Lab 09/10/19  1553   COLORU Yellow   APPEARANCEUA Clear   PHUR 7.0   SPECGRAV 1.010   PROTEINUA Negative   GLUCUA Negative   KETONESU Negative   BILIRUBINUA Negative   OCCULTUA Trace*   NITRITE Negative   UROBILINOGEN Negative   LEUKOCYTESUR Negative     All pertinent labs within the past 24 hours have been reviewed.    Significant Imaging: I have reviewed all pertinent imaging results/findings within the past 24 hours.     Imaging Results          X-Ray Tibia Fibula 2 View Left (Final result)  Result time 09/10/19 16:25:21   Procedure changed from X-Ray Tibia Fibula 2 View Right     Final result by Aseal Urbina MD (09/10/19 16:25:21)                 Impression:      No fracture or malalignment.  There is mild diffuse skin edema as could be seen with cellulitis.      Electronically signed by: Asael Urbina  Date:    09/10/2019  Time:    16:25             Narrative:    EXAMINATION:  XR TIBIA FIBULA 2 VIEW LEFT    CLINICAL HISTORY:  cellulitis;  Cellulitis of left lower limb    TECHNIQUE:  AP and lateral views of the left tibia and fibula were performed.    COMPARISON:  10/27/2016    FINDINGS:  Frontal lateral view with 3 images presented.  Mineralization and alignment and joint spaces are normal.  No fracture or erosion or periosteal reaction.  There is fatty replacement evident of the calf musculature probably involving the gastrocs soleus muscle similar with 10/27/2016.  No soft tissue defect or foreign body.  The Achilles tendon appears normal.  No soft tissue gas.  There is mild diffuse skin edema.

## 2019-09-11 NOTE — H&P
[facility] Hospital Medicine  History & Physical    Patient Name: Chucho Ulloa  MRN: [id number]  Admission Date: 9/10/2019  Attending Physician: Alfred Bull MD   Primary Care Provider: Deonna Villegas MD         Patient information was obtained from patient, spouse/SO and ER records.     Subjective:     Principal Problem:Cellulitis of left leg    Chief Complaint:   Chief Complaint   Patient presents with    Cellulitis     LLE redness and swelling since yesterday with no h/o injury. States h/o cellulitis in same extremity 2 years ago. Reports fever at home. On arrival, awake, alert, oriented. LLE edematous with c/o tightness in lower leg.         HPI:  Chucho Ulloa is a 28 year old male with a past medical history of Musclular Dystrophy. He lives in [place], [place] with his wife. His PCP is Dr. Deonna Villegas.     He presented to [facility] ED 9/10/2019 with a chief complaint of redness and swelling to his left lower extremity that started one day prior. He denies recent injury. Associated symptoms include fever and chills. He reports cleaning out the pond a week ago but did not have any problems at that time. He denies history of MRSA. Patient was placed on antibiotics the last time this occurred 2 years ago. Patient was seen in the ER yesterday but eloped because he did not want to wait any longer. ED workup revealed Temp (102), WBC (13.29), Na (135), CO2 (18), Glucose (111), Albumin (3.3), Occult blood UA (trace), CRP (172.2), Sed rate (12), Lacti acid (2.2), Xray left Tib/Fib revealed diffuse skin edema consistent with cellulitis otherwise unremarkable. Sepsis criteria met, Vanc/Zosyn started in ED, Admitted to Hospital Medicine for further evaluation and treatment.     Past Medical History:   Diagnosis Date    Cellulitis     Muscular dystrophy        Past Surgical History:   Procedure Laterality Date    CYST REMOVAL      HERNIA REPAIR         Review of patient's allergies  indicates:  No Known Allergies    Current Facility-Administered Medications on File Prior to Encounter   Medication    [DISCONTINUED] clindamycin 600 MG/50 ML D5W 600 mg/50 mL IVPB 600 mg    [DISCONTINUED] lactated ringers bolus 1,000 mL     No current outpatient medications on file prior to encounter.     Family History     Problem Relation (Age of Onset)    No Known Problems Mother, Father        Tobacco Use    Smoking status: Current Every Day Smoker     Packs/day: 0.50     Types: Cigarettes    Smokeless tobacco: Never Used   Substance and Sexual Activity    Alcohol use: No    Drug use: No    Sexual activity: Not on file     Review of Systems   Constitutional: Positive for chills and fever. Negative for unexpected weight change.   HENT: Negative for congestion, facial swelling and nosebleeds.    Eyes: Negative for visual disturbance.   Respiratory: Negative for cough, chest tightness and shortness of breath.    Cardiovascular: Positive for leg swelling. Negative for chest pain and palpitations.   Gastrointestinal: Negative for abdominal distention, abdominal pain, nausea and vomiting.   Endocrine: Negative for polydipsia and polyuria.   Genitourinary: Negative for dysuria, flank pain and urgency.   Musculoskeletal: Positive for gait problem and joint swelling. Negative for back pain.   Skin: Positive for rash. Negative for color change and wound.   Allergic/Immunologic: Negative for food allergies.   Neurological: Positive for weakness. Negative for tremors, seizures, numbness and headaches.   Psychiatric/Behavioral: Negative for agitation.     Objective:     Vital Signs (Most Recent):  Temp: (!) 102.2 °F (39 °C) (09/10/19 2044)  Pulse: 96 (09/10/19 2002)  Resp: 19 (09/10/19 2002)  BP: 121/69 (09/10/19 2002)  SpO2: 100 % (09/10/19 2002) Vital Signs (24h Range):  Temp:  [99.4 °F (37.4 °C)-103.9 °F (39.9 °C)] 102.2 °F (39 °C)  Pulse:  [] 96  Resp:  [19-24] 19  SpO2:  [98 %-100 %] 100 %  BP:  (107-172)/(63-89) 121/69     Weight: 108.9 kg (240 lb)  Body mass index is 36.49 kg/m².    Physical Exam   Constitutional: He is oriented to person, place, and time. He appears well-developed and well-nourished.   HENT:   Head: Normocephalic and atraumatic.   Eyes: Pupils are equal, round, and reactive to light.   Neck: Normal range of motion. No JVD present.   Cardiovascular: Normal rate and regular rhythm.   Pulmonary/Chest: Effort normal and breath sounds normal. No respiratory distress.   Abdominal: Soft. Bowel sounds are normal. He exhibits no distension.   Musculoskeletal: Normal range of motion. He exhibits edema.   Neurological: He is alert and oriented to person, place, and time.   Skin: Skin is warm and dry. Capillary refill takes less than 2 seconds.   (Large area of erythema to anterior aspect of LLE, mild tenderness on palpation. No abscess, crepitus, or necrosis).    Psychiatric: He has a normal mood and affect.         CRANIAL NERVES     CN III, IV, VI   Pupils are equal, round, and reactive to light.       Significant Labs:   Bilirubin:   Recent Labs   Lab 09/09/19  2156 09/10/19  1623   BILITOT 0.6 0.7     Blood Culture:   Recent Labs   Lab 09/09/19 2153   LABBLOO No Growth to date     BMP:   Recent Labs   Lab 09/10/19  1623   *   *   K 3.5      CO2 18*   BUN 6   CREATININE 0.5   CALCIUM 8.2*     CBC:   Recent Labs   Lab 09/10/19  1332   WBC 13.29*   HGB 15.4   HCT 46.4        CMP:   Recent Labs   Lab 09/09/19  2156 09/10/19  1623   * 135*   K 3.5 3.5    108   CO2 14* 18*   GLU 92 111*   BUN 8 6   CREATININE 0.6 0.5   CALCIUM 8.9 8.2*   PROT 7.4 6.3   ALBUMIN 4.2 3.3*   BILITOT 0.6 0.7   ALKPHOS 67 52*   AST 25 23   ALT 29 26   ANIONGAP 13 9   EGFRNONAA >60 >60     Lactic Acid:   Recent Labs   Lab 09/10/19  1332   LACTATE 2.2     Urine Studies:   Recent Labs   Lab 09/10/19  1553   COLORU Yellow   APPEARANCEUA Clear   PHUR 7.0   SPECGRAV 1.010   PROTEINUA  Negative   GLUCUA Negative   KETONESU Negative   BILIRUBINUA Negative   OCCULTUA Trace*   NITRITE Negative   UROBILINOGEN Negative   LEUKOCYTESUR Negative     All pertinent labs within the past 24 hours have been reviewed.    Significant Imaging: I have reviewed all pertinent imaging results/findings within the past 24 hours.     Imaging Results          X-Ray Tibia Fibula 2 View Left (Final result)  Result time 09/10/19 16:25:21   Procedure changed from X-Ray Tibia Fibula 2 View Right     Final result by Asael Urbina MD (09/10/19 16:25:21)                 Impression:      No fracture or malalignment.  There is mild diffuse skin edema as could be seen with cellulitis.      Electronically signed by: Asael Urbina  Date:    09/10/2019  Time:    16:25             Narrative:    EXAMINATION:  XR TIBIA FIBULA 2 VIEW LEFT    CLINICAL HISTORY:  cellulitis;  Cellulitis of left lower limb    TECHNIQUE:  AP and lateral views of the left tibia and fibula were performed.    COMPARISON:  10/27/2016    FINDINGS:  Frontal lateral view with 3 images presented.  Mineralization and alignment and joint spaces are normal.  No fracture or erosion or periosteal reaction.  There is fatty replacement evident of the calf musculature probably involving the gastrocs soleus muscle similar with 10/27/2016.  No soft tissue defect or foreign body.  The Achilles tendon appears normal.  No soft tissue gas.  There is mild diffuse skin edema.                                  Assessment/Plan:     * Cellulitis of left leg  Sepsis affecting Skin  Leukocytosis    High grade fever 103.9, tachycardic, labs indicated leulocytosis, elevated lactic acid, elevated CRP, sepsis criteria met, Vanc/Zosyn initated.    Continue Vanc/Zosyn  Fall precautions  Elevated affected extremity  Lovenox 40mg SQ daily  Pain management  Monitor temp  Monitor WBC  Trend lactate     Muscular dystrophy  Chronic. Noted      Hyponatremia  Na (135)  NS 125ml/hr  Monitor  electrolytes         VTE Risk Mitigation (From admission, onward)        Ordered     enoxaparin injection 40 mg  Daily      09/10/19 1911             Sharee Marin, APRN, FNP-C  Department of Hospital Medicine   [facility]

## 2019-09-11 NOTE — PROGRESS NOTES
Smoking cessation education and resources provided. 21 mg nicotine patch ordered. Pt referred to 1-800-QUIT-NOW for additional smoking cessation resources following hospital discharge.

## 2019-09-11 NOTE — PLAN OF CARE
Problem: Adult Inpatient Plan of Care  Goal: Plan of Care Review  Outcome: Ongoing (interventions implemented as appropriate)  Pt on RA with documented sats.97. Will continue to monitor.

## 2019-09-12 ENCOUNTER — ANESTHESIA (OUTPATIENT)
Dept: CARDIOLOGY | Facility: HOSPITAL | Age: 29
End: 2019-09-12
Payer: MEDICAID

## 2019-09-12 ENCOUNTER — ANESTHESIA EVENT (OUTPATIENT)
Dept: CARDIOLOGY | Facility: HOSPITAL | Age: 29
End: 2019-09-12
Payer: MEDICAID

## 2019-09-12 VITALS
TEMPERATURE: 98 F | DIASTOLIC BLOOD PRESSURE: 82 MMHG | BODY MASS INDEX: 39.09 KG/M2 | WEIGHT: 257.94 LBS | HEART RATE: 86 BPM | OXYGEN SATURATION: 97 % | RESPIRATION RATE: 18 BRPM | SYSTOLIC BLOOD PRESSURE: 131 MMHG | HEIGHT: 68 IN

## 2019-09-12 LAB
ALBUMIN SERPL BCP-MCNC: 3 G/DL (ref 3.5–5.2)
ALP SERPL-CCNC: 54 U/L (ref 55–135)
ALT SERPL W/O P-5'-P-CCNC: 31 U/L (ref 10–44)
ANION GAP SERPL CALC-SCNC: 7 MMOL/L (ref 8–16)
AST SERPL-CCNC: 17 U/L (ref 10–40)
BILIRUB DIRECT SERPL-MCNC: 0.2 MG/DL (ref 0.1–0.3)
BILIRUB SERPL-MCNC: 0.3 MG/DL (ref 0.1–1)
BUN SERPL-MCNC: 4 MG/DL (ref 6–20)
CALCIUM SERPL-MCNC: 8.5 MG/DL (ref 8.7–10.5)
CHLORIDE SERPL-SCNC: 108 MMOL/L (ref 95–110)
CO2 SERPL-SCNC: 25 MMOL/L (ref 23–29)
CREAT SERPL-MCNC: 0.5 MG/DL (ref 0.5–1.4)
ERYTHROCYTE [DISTWIDTH] IN BLOOD BY AUTOMATED COUNT: 13.6 % (ref 11.5–14.5)
EST. GFR  (AFRICAN AMERICAN): >60 ML/MIN/1.73 M^2
EST. GFR  (NON AFRICAN AMERICAN): >60 ML/MIN/1.73 M^2
GLUCOSE SERPL-MCNC: 109 MG/DL (ref 70–110)
HCT VFR BLD AUTO: 40.1 % (ref 40–54)
HGB BLD-MCNC: 13.3 G/DL (ref 14–18)
MAGNESIUM SERPL-MCNC: 2 MG/DL (ref 1.6–2.6)
MCH RBC QN AUTO: 29.3 PG (ref 27–31)
MCHC RBC AUTO-ENTMCNC: 33.2 G/DL (ref 32–36)
MCV RBC AUTO: 88 FL (ref 82–98)
PHOSPHATE SERPL-MCNC: 2.8 MG/DL (ref 2.7–4.5)
PLATELET # BLD AUTO: 180 K/UL (ref 150–350)
PMV BLD AUTO: 9.2 FL (ref 9.2–12.9)
POTASSIUM SERPL-SCNC: 3.4 MMOL/L (ref 3.5–5.1)
PROT SERPL-MCNC: 6.2 G/DL (ref 6–8.4)
RBC # BLD AUTO: 4.54 M/UL (ref 4.6–6.2)
SODIUM SERPL-SCNC: 140 MMOL/L (ref 136–145)
VANCOMYCIN TROUGH SERPL-MCNC: 11.4 UG/ML (ref 10–22)
VANCOMYCIN TROUGH SERPL-MCNC: 7.4 UG/ML (ref 10–22)
WBC # BLD AUTO: 7.95 K/UL (ref 3.9–12.7)

## 2019-09-12 PROCEDURE — 97116 GAIT TRAINING THERAPY: CPT

## 2019-09-12 PROCEDURE — 83735 ASSAY OF MAGNESIUM: CPT

## 2019-09-12 PROCEDURE — 96376 TX/PRO/DX INJ SAME DRUG ADON: CPT

## 2019-09-12 PROCEDURE — 36000 PLACE NEEDLE IN VEIN: CPT | Performed by: STUDENT IN AN ORGANIZED HEALTH CARE EDUCATION/TRAINING PROGRAM

## 2019-09-12 PROCEDURE — 25000003 PHARM REV CODE 250: Performed by: FAMILY MEDICINE

## 2019-09-12 PROCEDURE — 63600175 PHARM REV CODE 636 W HCPCS: Performed by: NURSE PRACTITIONER

## 2019-09-12 PROCEDURE — G0378 HOSPITAL OBSERVATION PER HR: HCPCS

## 2019-09-12 PROCEDURE — S4991 NICOTINE PATCH NONLEGEND: HCPCS | Performed by: FAMILY MEDICINE

## 2019-09-12 PROCEDURE — 80202 ASSAY OF VANCOMYCIN: CPT

## 2019-09-12 PROCEDURE — 80202 ASSAY OF VANCOMYCIN: CPT | Mod: 91

## 2019-09-12 PROCEDURE — 25000003 PHARM REV CODE 250: Performed by: HOSPITALIST

## 2019-09-12 PROCEDURE — 84100 ASSAY OF PHOSPHORUS: CPT

## 2019-09-12 PROCEDURE — 85027 COMPLETE CBC AUTOMATED: CPT

## 2019-09-12 PROCEDURE — 36415 COLL VENOUS BLD VENIPUNCTURE: CPT

## 2019-09-12 PROCEDURE — 97161 PT EVAL LOW COMPLEX 20 MIN: CPT

## 2019-09-12 PROCEDURE — 80048 BASIC METABOLIC PNL TOTAL CA: CPT

## 2019-09-12 PROCEDURE — 80076 HEPATIC FUNCTION PANEL: CPT

## 2019-09-12 PROCEDURE — 94761 N-INVAS EAR/PLS OXIMETRY MLT: CPT

## 2019-09-12 RX ORDER — FUROSEMIDE 40 MG/1
40 TABLET ORAL DAILY
Status: DISCONTINUED | OUTPATIENT
Start: 2019-09-12 | End: 2019-09-12

## 2019-09-12 RX ORDER — POTASSIUM CHLORIDE 750 MG/1
40 TABLET, EXTENDED RELEASE ORAL ONCE
Status: COMPLETED | OUTPATIENT
Start: 2019-09-12 | End: 2019-09-12

## 2019-09-12 RX ORDER — IBUPROFEN 200 MG
1 TABLET ORAL DAILY
Refills: 0 | COMMUNITY
Start: 2019-09-13

## 2019-09-12 RX ORDER — SULFAMETHOXAZOLE AND TRIMETHOPRIM 800; 160 MG/1; MG/1
1 TABLET ORAL 2 TIMES DAILY
Qty: 20 TABLET | Refills: 0 | Status: SHIPPED | OUTPATIENT
Start: 2019-09-12 | End: 2019-09-22

## 2019-09-12 RX ORDER — HYDROCODONE BITARTRATE AND ACETAMINOPHEN 5; 325 MG/1; MG/1
1 TABLET ORAL EVERY 8 HOURS PRN
Qty: 10 TABLET | Refills: 0 | Status: SHIPPED | OUTPATIENT
Start: 2019-09-12

## 2019-09-12 RX ADMIN — HYDROCODONE BITARTRATE AND ACETAMINOPHEN 1 TABLET: 5; 325 TABLET ORAL at 02:09

## 2019-09-12 RX ADMIN — HYDROCODONE BITARTRATE AND ACETAMINOPHEN 1 TABLET: 5; 325 TABLET ORAL at 08:09

## 2019-09-12 RX ADMIN — PIPERACILLIN AND TAZOBACTAM 4.5 G: 4; .5 INJECTION, POWDER, LYOPHILIZED, FOR SOLUTION INTRAVENOUS; PARENTERAL at 02:09

## 2019-09-12 RX ADMIN — NICOTINE 1 PATCH: 21 PATCH TRANSDERMAL at 08:09

## 2019-09-12 RX ADMIN — POTASSIUM CHLORIDE 40 MEQ: 750 TABLET, EXTENDED RELEASE ORAL at 08:09

## 2019-09-12 RX ADMIN — VANCOMYCIN HYDROCHLORIDE 1750 MG: 100 INJECTION, POWDER, LYOPHILIZED, FOR SOLUTION INTRAVENOUS at 08:09

## 2019-09-12 NOTE — NURSING
2110H Complains of nausea, ondansetron given.   2258H Patient complains of crushing pain on his left leg, morphine 2mg given as ordered by Juli Marin. Acetaminophen given for temp 101.7F.

## 2019-09-12 NOTE — PLAN OF CARE
"Pt is ready for d/c to home today, visited pt and wife who have concerns about pt not ambulating for "past 4 days".  Pt has been admitted since 9/10 which does not include 4 days as stated by wife.  Pt was ambulating independently with no assistive device just prior to admission.  Therapy will assess pt as he does have history of MD.  Pt has no additional needs from , has transportation to home when ready to d/c.      Camila Cordoba RN    [phone]  "

## 2019-09-12 NOTE — ANESTHESIA PROCEDURE NOTES
Peripheral IV Insertion    Diagnosis: I87.9 Disorder of vein, unspecified.    Patient location during procedure: floor  Procedure start time: 9/12/2019 1:42 AM  Procedure end time: 9/12/2019 2:17 AM    Staffing  Authorizing Provider: Alice Mariscal MD  Performing Provider: Alice Mariscal MD    Anesthesiologist was present at the time of the procedure.    Preanesthetic Checklist  Completed: patient identified and IV checkedPeripheral IV Insertion  Skin Prep: alcohol swabs  Local Infiltration: none  Orientation: left  Location: forearm  Catheter Size: 20 G  Catheter placement by Ultrasound guidance. Heme positive aspiration all ports.  Vessel Caliber: medium, patent, compressibility normalInsertion Attempts: 3  Assessment  Dressing: secured with tape and tegaderm  Patient: Tolerated well  Line flushed easily.

## 2019-09-12 NOTE — PLAN OF CARE
Problem: Adult Inpatient Plan of Care  Goal: Plan of Care Review  Outcome: Ongoing (interventions implemented as appropriate)  Patient safety maintained. Medications administered per order. Assistance provided as needed. Orders placed for therapy. Monitoring and treating pain as needed.

## 2019-09-12 NOTE — PLAN OF CARE
09/12/19 1226   Final Note   Assessment Type Final Discharge Note   Anticipated Discharge Disposition Home   Hospital Follow Up  Appt(s) scheduled? Yes   Discharge plans and expectations educations in teach back method with documentation complete? Yes   Right Care Referral Info   Post Acute Recommendation No Care       PCP appt scheduled and placed in AVS.  Ambulatory referral to PT/OT placed.    Camila Cordoba RN    [phone]

## 2019-09-12 NOTE — DISCHARGE SUMMARY
[facility] Hospital Medicine  Discharge Summary      Patient Name: Chucho Ulloa  MRN: [id number]  Admission Date: 9/10/2019  Hospital Length of Stay: 0 days  Discharge Date and Time: 9/12/2019  2:01 PM  Attending Physician: Roxanne Diaz*   Discharging Provider: Roxanne Diaz MD  Primary Care Provider: Deonna Villegas MD      HPI:    Chucho Ulloa is a 28 year old male with a past medical history of Musclular Dystrophy. He lives in [place], [place] with his wife. His PCP is Dr. Deonna Villegas.     He presented to [facility] ED 9/10/2019 with a chief complaint of redness and swelling to his left lower extremity that started one day prior. He denies recent injury. Associated symptoms include fever and chills. He reports cleaning out the pond a week ago but did not have any problems at that time. He denies history of MRSA. Patient was placed on antibiotics the last time this occurred 2 years ago. Patient was seen in the ER yesterday but eloped because he did not want to wait any longer. ED workup revealed Temp (102), WBC (13.29), Na (135), CO2 (18), Glucose (111), Albumin (3.3), Occult blood UA (trace), CRP (172.2), Sed rate (12), Lacti acid (2.2), Xray left Tib/Fib revealed diffuse skin edema consistent with cellulitis otherwise unremarkable. Sepsis criteria met, Vanc/Zosyn started in ED, Admitted to Hospital Medicine for further evaluation and treatment.     * No surgery found *      Hospital Course:   Swelling/ erythema stable, continue abx. LLE erythema improved, discharge on PO abx.      Consults:     * Cellulitis of left leg  Sepsis affecting Skin  Leukocytosis    High grade fever 103.9, tachycardic, labs indicated leulocytosis, elevated lactic acid, elevated CRP, sepsis criteria met, Vanc/Zosyn initated.    Continue Vanc/Zosyn switch to Po abx on discharge  Fall precautions  Elevated affected extremity  Lovenox 40mg SQ daily  Pain management  Monitor temp  Monitor  WBC  Trend lactate     Hypokalemia  replace      Hyponatremia  Na (135)  NS 125ml/hr  Monitor electrolytes         Final Active Diagnoses:    Diagnosis Date Noted POA    PRINCIPAL PROBLEM:  Cellulitis of left leg [L03.116] 09/10/2019 Yes    Hypokalemia [E87.6] 09/11/2019 No    Hypophosphatemia [E83.39] 09/11/2019 Yes    Sepsis affecting skin [A41.9] 09/10/2019 Yes    Hyponatremia [E87.1] 09/10/2019 Yes    Leukocytosis [D72.829] 09/10/2019 Yes    Muscular dystrophy [G71.00] 09/10/2019 Yes      Problems Resolved During this Admission:       Discharged Condition: stable    Disposition: Home or Self Care    Follow Up:  Follow-up Information     Deonna Villegas MD In 1 week.    Specialty:  General Practice  Contact information:  [address]  [facility] [address]  [phone]                 Patient Instructions:      Diet Adult Regular     Activity as tolerated       Significant Diagnostic Studies:     Pending Diagnostic Studies:     None         Medications:  Reconciled Home Medications:      Medication List      START taking these medications    nicotine 21 mg/24 hr  Commonly known as:  NICODERM CQ  Place 1 patch onto the skin once daily.  Start taking on:  9/13/2019     sulfamethoxazole-trimethoprim 800-160mg 800-160 mg Tab  Commonly known as:  BACTRIM DS  Take 1 tablet by mouth 2 (two) times daily. for 10 days            Indwelling Lines/Drains at time of discharge:   Lines/Drains/Airways          None          Time spent on the discharge of patient: 35 minutes  Patient was seen and examined on the date of discharge and determined to be suitable for discharge.         Roxanne Diaz MD  Department of Hospital Medicine  [facility]

## 2019-09-12 NOTE — PLAN OF CARE
"VN note: VN spoke with Ashlyn RN (ANNABELLA). She reports reviewed discharge with patient. Patient also requesting pain medication on discharge. He was also complaining he hasn't "walked in days." VN reviewed admission. Patient admitted 9/10/19. I notified Dr. Banegas. She informed VN she would write pain medication. She also stated patient discharging today, and she can order PT prior to patient leaving. VN placed PT order. VN called and notified PT department. They have a few cases before patient and will try their best to see him. They informed nurse to have mobility tech or nurse get patient out of bed since independent prior. VN notified bedside nurse Yandy. Will continue to follow.  "

## 2019-09-12 NOTE — SUBJECTIVE & OBJECTIVE
Interval History: awake and alert, LLE erythema improved.   Replace K  Wbc down trending. Blood cx NGTD, de-escalate abx to Bactrim and discharge home today      Review of Systems   Constitutional: Negative for fever.   Cardiovascular: Negative for palpitations and leg swelling.   Gastrointestinal: Negative for nausea.   Musculoskeletal: Positive for gait problem. Negative for back pain and joint swelling.   Skin: Positive for color change. Negative for rash and wound.   Psychiatric/Behavioral: Negative for agitation.     Objective:     Vital Signs (Most Recent):  Temp: 98.5 °F (36.9 °C) (09/12/19 0344)  Pulse: 84 (09/12/19 0400)  Resp: 20 (09/12/19 0349)  BP: 118/73 (09/12/19 0344)  SpO2: 97 % (09/12/19 0349) Vital Signs (24h Range):  Temp:  [98.1 °F (36.7 °C)-101.7 °F (38.7 °C)] 98.5 °F (36.9 °C)  Pulse:  [] 84  Resp:  [18-21] 20  SpO2:  [96 %-99 %] 97 %  BP: (109-127)/(62-78) 118/73     Weight: 117 kg (257 lb 15 oz)  Body mass index is 39.22 kg/m².    Intake/Output Summary (Last 24 hours) at 9/12/2019 0719  Last data filed at 9/12/2019 0300  Gross per 24 hour   Intake 1950 ml   Output 1500 ml   Net 450 ml      Physical Exam   Constitutional: He is oriented to person, place, and time. He appears well-developed and well-nourished.   HENT:   Head: Normocephalic and atraumatic.   Neck: Neck supple.   Cardiovascular: Normal rate and regular rhythm.   Pulmonary/Chest: Effort normal and breath sounds normal. No respiratory distress.   Abdominal: Soft. Bowel sounds are normal. He exhibits no distension.   Musculoskeletal: Normal range of motion. He exhibits edema.   Neurological: He is alert and oriented to person, place, and time.   Skin: Skin is warm and dry. Capillary refill takes less than 2 seconds.   erythema to anterior aspect of LLE, resolving,      Psychiatric: He has a normal mood and affect.       Significant Labs:   Blood Culture:   Recent Labs   Lab 09/10/19  1243 09/10/19  1330   LABBLOO No Growth to  date  No Growth to date No Growth to date  No Growth to date     CBC:   Recent Labs   Lab 09/10/19  1332 09/11/19  0602 09/12/19  0341   WBC 13.29* 9.12 7.95   HGB 15.4 13.3* 13.3*   HCT 46.4 40.3 40.1    185 180     CMP:   Recent Labs   Lab 09/10/19  1623 09/11/19  0602 09/12/19  0341   * 139 140   K 3.5 3.4* 3.4*    108 108   CO2 18* 26 25   * 132* 109   BUN 6 7 4*   CREATININE 0.5 0.5 0.5   CALCIUM 8.2* 8.2* 8.5*   PROT 6.3 5.8* 6.2   ALBUMIN 3.3* 3.0* 3.0*   BILITOT 0.7 0.4 0.3   ALKPHOS 52* 55 54*   AST 23 17 17   ALT 26 24 31   ANIONGAP 9 5* 7*   EGFRNONAA >60 >60 >60     Lactic Acid:   Recent Labs   Lab 09/10/19  1332 09/10/19  2130   LACTATE 2.2 1.9     Lipase: No results for input(s): LIPASE in the last 48 hours.  TSH: No results for input(s): TSH in the last 4320 hours.  Urine Culture: No results for input(s): LABURIN in the last 48 hours.  Urine Studies:   Recent Labs   Lab 09/10/19  1553   COLORU Yellow   APPEARANCEUA Clear   PHUR 7.0   SPECGRAV 1.010   PROTEINUA Negative   GLUCUA Negative   KETONESU Negative   BILIRUBINUA Negative   OCCULTUA Trace*   NITRITE Negative   UROBILINOGEN Negative   LEUKOCYTESUR Negative       Significant Imaging: I have reviewed all pertinent imaging results/findings within the past 24 hours.

## 2019-09-12 NOTE — PROGRESS NOTES
[facility] Medicine  Progress Note    Patient Name: Chucho Ulloa  MRN: [id number]  Patient Class: OP- Observation   Admission Date: 9/10/2019  Length of Stay: 0 days  Attending Physician: Roxanne Diaz*  Primary Care Provider: Deonna Villegas MD        Subjective:     Principal Problem:Cellulitis of left leg        HPI:   Chucho Ulloa is a 28 year old male with a past medical history of Musclular Dystrophy. He lives in [place], [place] with his wife. His PCP is Dr. Deonna Villegas.     He presented to [facility] ED 9/10/2019 with a chief complaint of redness and swelling to his left lower extremity that started one day prior. He denies recent injury. Associated symptoms include fever and chills. He reports cleaning out the pond a week ago but did not have any problems at that time. He denies history of MRSA. Patient was placed on antibiotics the last time this occurred 2 years ago. Patient was seen in the ER yesterday but eloped because he did not want to wait any longer. ED workup revealed Temp (102), WBC (13.29), Na (135), CO2 (18), Glucose (111), Albumin (3.3), Occult blood UA (trace), CRP (172.2), Sed rate (12), Lacti acid (2.2), Xray left Tib/Fib revealed diffuse skin edema consistent with cellulitis otherwise unremarkable. Sepsis criteria met, Vanc/Zosyn started in ED, Admitted to Hospital Medicine for further evaluation and treatment.     Overview/Hospital Course:  Swelling/ erythema stable, continue abx. LLE erythema improved, discharge on PO abx.     Interval History: awake and alert, LLE erythema improved.   Replace K  Wbc down trending. Blood cx NGTD, de-escalate abx to Bactrim and discharge home today      Review of Systems   Constitutional: Negative for fever.   Cardiovascular: Negative for palpitations and leg swelling.   Gastrointestinal: Negative for nausea.   Musculoskeletal: Positive for gait problem. Negative for back pain and joint swelling.   Skin:  Positive for color change. Negative for rash and wound.   Psychiatric/Behavioral: Negative for agitation.     Objective:     Vital Signs (Most Recent):  Temp: 98.5 °F (36.9 °C) (09/12/19 0344)  Pulse: 84 (09/12/19 0400)  Resp: 20 (09/12/19 0349)  BP: 118/73 (09/12/19 0344)  SpO2: 97 % (09/12/19 0349) Vital Signs (24h Range):  Temp:  [98.1 °F (36.7 °C)-101.7 °F (38.7 °C)] 98.5 °F (36.9 °C)  Pulse:  [] 84  Resp:  [18-21] 20  SpO2:  [96 %-99 %] 97 %  BP: (109-127)/(62-78) 118/73     Weight: 117 kg (257 lb 15 oz)  Body mass index is 39.22 kg/m².    Intake/Output Summary (Last 24 hours) at 9/12/2019 0719  Last data filed at 9/12/2019 0300  Gross per 24 hour   Intake 1950 ml   Output 1500 ml   Net 450 ml      Physical Exam   Constitutional: He is oriented to person, place, and time. He appears well-developed and well-nourished.   HENT:   Head: Normocephalic and atraumatic.   Neck: Neck supple.   Cardiovascular: Normal rate and regular rhythm.   Pulmonary/Chest: Effort normal and breath sounds normal. No respiratory distress.   Abdominal: Soft. Bowel sounds are normal. He exhibits no distension.   Musculoskeletal: Normal range of motion. He exhibits edema.   Neurological: He is alert and oriented to person, place, and time.   Skin: Skin is warm and dry. Capillary refill takes less than 2 seconds.   erythema to anterior aspect of LLE, resolving,      Psychiatric: He has a normal mood and affect.       Significant Labs:   Blood Culture:   Recent Labs   Lab 09/10/19  1243 09/10/19  1330   LABBLOO No Growth to date  No Growth to date No Growth to date  No Growth to date     CBC:   Recent Labs   Lab 09/10/19  1332 09/11/19  0602 09/12/19  0341   WBC 13.29* 9.12 7.95   HGB 15.4 13.3* 13.3*   HCT 46.4 40.3 40.1    185 180     CMP:   Recent Labs   Lab 09/10/19  1623 09/11/19  0602 09/12/19  0341   * 139 140   K 3.5 3.4* 3.4*    108 108   CO2 18* 26 25   * 132* 109   BUN 6 7 4*   CREATININE 0.5 0.5  0.5   CALCIUM 8.2* 8.2* 8.5*   PROT 6.3 5.8* 6.2   ALBUMIN 3.3* 3.0* 3.0*   BILITOT 0.7 0.4 0.3   ALKPHOS 52* 55 54*   AST 23 17 17   ALT 26 24 31   ANIONGAP 9 5* 7*   EGFRNONAA >60 >60 >60     Lactic Acid:   Recent Labs   Lab 09/10/19  1332 09/10/19  2130   LACTATE 2.2 1.9     Lipase: No results for input(s): LIPASE in the last 48 hours.  TSH: No results for input(s): TSH in the last 4320 hours.  Urine Culture: No results for input(s): LABURIN in the last 48 hours.  Urine Studies:   Recent Labs   Lab 09/10/19  1553   COLORU Yellow   APPEARANCEUA Clear   PHUR 7.0   SPECGRAV 1.010   PROTEINUA Negative   GLUCUA Negative   KETONESU Negative   BILIRUBINUA Negative   OCCULTUA Trace*   NITRITE Negative   UROBILINOGEN Negative   LEUKOCYTESUR Negative       Significant Imaging: I have reviewed all pertinent imaging results/findings within the past 24 hours.      Assessment/Plan:      * Cellulitis of left leg  Sepsis affecting Skin  Leukocytosis    High grade fever 103.9, tachycardic, labs indicated leulocytosis, elevated lactic acid, elevated CRP, sepsis criteria met, Vanc/Zosyn initated.    Continue Vanc/Zosyn switch to Po abx on discharge  Fall precautions  Elevated affected extremity  Lovenox 40mg SQ daily  Pain management  Monitor temp  Monitor WBC  Trend lactate     Hypophosphatemia    replace    Hypokalemia  replace      Muscular dystrophy  Chronic. Noted      Hyponatremia  Na (135)  NS 125ml/hr  Monitor electrolytes         VTE Risk Mitigation (From admission, onward)        Ordered     IP VTE HIGH RISK PATIENT  Once      09/11/19 0102     enoxaparin injection 40 mg  Daily      09/10/19 1911                Roxanne Diaz MD  Department of Hospital Medicine   [facility]

## 2019-09-12 NOTE — ASSESSMENT & PLAN NOTE
Sepsis affecting Skin  Leukocytosis    High grade fever 103.9, tachycardic, labs indicated leulocytosis, elevated lactic acid, elevated CRP, sepsis criteria met, Vanc/Zosyn initated.    Continue Vanc/Zosyn switch to Po abx on discharge  Fall precautions  Elevated affected extremity  Lovenox 40mg SQ daily  Pain management  Monitor temp  Monitor WBC  Trend lactate

## 2019-09-12 NOTE — PROGRESS NOTES
Pharmacokinetic Assessment Follow Up: IV Vancomycin    Vancomycin serum concentration assessment(s):    The trough level was drawn correctly and can be used to guide therapy at this time. The measurement is within the desired definitive target range of 15 to 20 mcg/mL.    Vancomycin Regimen Plan:    Continue regimen to Vancomycin 1750 mg IV every 12 hours with next serum trough concentration measured at   prior to 4th dose on 9/14     Drug levels (last 3 results):  Recent Labs   Lab Result Units 09/12/19  0341   Vancomycin-Trough ug/mL 11.4       Pharmacy will continue to follow and monitor vancomycin.    Please contact pharmacy at extension [phone] for questions regarding this assessment.    Thank you for the consult,   Mo Hernandez       Patient brief summary:  Chucho Ulloa is a 28 y.o. male initiated on antimicrobial therapy with IV Vancomycin for treatment of sepsis    The patient's current regimen is vancomycin 1750mg IV q12h    Drug Allergies:   Review of patient's allergies indicates:  No Known Allergies    Actual Body Weight:   117kg    Renal Function:   Estimated Creatinine Clearance: 273.2 mL/min (based on SCr of 0.5 mg/dL).,     Dialysis Method (if applicable):      CBC (last 72 hours):  Recent Labs   Lab Result Units 09/10/19  1332 09/11/19  0602 09/12/19  0341   WBC K/uL 13.29* 9.12 7.95   Hemoglobin g/dL 15.4 13.3* 13.3*   Hemoglobin A1C %  --  5.2  --    Hematocrit % 46.4 40.3 40.1   Platelets K/uL 223 185 180   Gran% % 85.0*  --   --    Lymph% % 8.0*  --   --    Mono% % 6.7  --   --    Eosinophil% % 0.1  --   --    Basophil% % 0.2  --   --    Differential Method  Automated  --   --        Metabolic Panel (last 72 hours):  Recent Labs   Lab Result Units 09/09/19  2156 09/10/19  1553 09/10/19  1623 09/11/19  0602 09/12/19  0341   Sodium mmol/L 135*  --  135* 139 140   Potassium mmol/L 3.5  --  3.5 3.4* 3.4*   Chloride mmol/L 108  --  108 108 108   CO2 mmol/L 14*  --  18* 26 25   Glucose mg/dL 92   --  111* 132* 109   Glucose, UA   --  Negative  --   --   --    BUN, Bld mg/dL 8  --  6 7 4*   Creatinine mg/dL 0.6  --  0.5 0.5 0.5   Albumin g/dL 4.2  --  3.3* 3.0* 3.0*   Total Bilirubin mg/dL 0.6  --  0.7 0.4 0.3   Alkaline Phosphatase U/L 67  --  52* 55 54*   AST U/L 25  --  23 17 17   ALT U/L 29  --  26 24 31   Magnesium mg/dL  --   --   --  2.2 2.0   Phosphorus mg/dL  --   --   --  2.5* 2.8       Vancomycin Administrations:  vancomycin given in the last 96 hours                     vancomycin (VANCOCIN) 1,750 mg in dextrose 5 % 500 mL IVPB (mg) 1,750 mg New Bag 09/11/19 1952     1,750 mg New Bag  0433    vancomycin (VANCOCIN) 2,000 mg in dextrose 5 % 500 mL IVPB (mg) 2,000 mg New Bag 09/10/19 1628                      Microbiologic Results:  Microbiology Results (last 7 days)       Procedure Component Value Units Date/Time    Blood culture #2 **CANNOT BE ORDERED STAT** [[id number]] Collected:  09/10/19 1330    Order Status:  Completed Specimen:  Blood from Peripheral, Upper Arm, Left Updated:  09/11/19 2012     Blood Culture, Routine No Growth to date      No Growth to date    Blood culture #1 **CANNOT BE ORDERED STAT** [[id number]] Collected:  09/10/19 1243    Order Status:  Completed Specimen:  Blood from Peripheral, Antecubital, Left Updated:  09/11/19 2012     Blood Culture, Routine No Growth to date      No Growth to date

## 2019-09-12 NOTE — PLAN OF CARE
"VN cued into room to go over discharge instructions.  Pt in bed, family at bedside.  Educated patient on medications, follow up appointments, and when to call the doctor.  Pt and family verbalized understanding.  Pt requested pain medication at home.  Pt stated he had "not gotten out of bed for 4 days" and was concerned about his ability to get around at home.  VN notified bedside nurse about mobility concerns and pain medicine request.  "

## 2019-09-12 NOTE — PT/OT/SLP EVAL
Physical Therapy Evaluation and Discharge Note    Patient Name:  Chucho Ulloa   MRN:  [id number]    Recommendations:     Discharge Recommendations:  outpatient PT   Discharge Equipment Recommendations: (gait belt)   Barriers to discharge: None    Assessment:     Chucho Ulloa is a 28 y.o. male admitted with a medical diagnosis of Cellulitis of left leg. 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 Has supportive wife and family that do not allow him to ever be without supervision. Has had aquatic therapy in the past but had scheduling difficulties - the only time he could go was before 1 p.m. And then he was wiped out for the rest of the day. At baseline, pt requires assistance from wife/family for transfers and ADL's, now with pain from cellulitis has difficulty with ambulation. Recommendation is for discharge to outpatient therapy setting  Recent Surgery: * No surgery found *      Plan:     During this hospitalization, patient does not require further acute PT services.  Please re-consult if situation changes.      Subjective     Chief Complaint: pain in L leg with weight bearing  Patient/Family Comments/goals: Wife / pt report concern over ability to ambulate since cellulitis diagnosis  Pain/Comfort:  · Pain Rating 1: 6/10  · Location - Side 1: Left  · Location - Orientation 1: lower  · Location 1: leg  · Pain Addressed 1: Pre-medicate for activity, Distraction, Nurse notified, Cessation of Activity, Reposition  · Pain Rating Post-Intervention 1: 8/10(pain significantly increased with weight bearing, worsening over tiem)    Patients cultural, spiritual, Restorationism conflicts given the current situation: no    Living Environment:  With wife and young children age 3 and 4 in a single story home, no steps to enter home. Has grab bars in tub Does not use DME.   Prior to admission, patients level of function was mod A of 1 to assist patient in transfers from sit to stand from bed/toilet. Upon standing patient  able to ambulate without device. Wife assists with lower body dressing. Pt typically uses momentum for movement due to mms weakness in hips and shoulders.  Patient tends to stand for several hours at a time. Equipment used at home: none.  DME owned (not currently used): none.  Upon discharge, patient will have assistance from wife/brothers that live across street. Has supervision 24/7 from supportive family.    Objective:     Communicated with RN and CM prior to session.  Patient found supine with  Wife and kids,  upon PT entry to room.    General Precautions: Standard, fall   Orthopedic Precautions:N/A   Braces: N/A     Exams:  · Cognitive Exam:  Patient is oriented to Person, Place, Time and Situation, very distractable by pain during weight bearing. Mild slurring in speech  · Gross Motor Coordination:  Impaired 2/2 limb/girdle weakness, has to use momentum for movement of UE's/LE's - uncontrolled movements particularly moving stand to sit, or swinging legs in /out of bed  · Postural Exam:  Patient presented with the following abnormalities:    · -       Rounded shoulders  · -       Forward head  · -       Lateral weight shift of hips  · -       Wide base of support  · Sensation:    · -       Intact  · Skin Integrity/Edema:      · -       LLE mild erythema, reduced in size from ink outline of area affected by cellulitis  · UE ROM/strength significantly impacted, props self on hands with flexed elbows, triceps weaker than biceps, shoulder girdle grossly 2+/5  · LE ROM/Strength also impaired - trace contractions in quadriceps when commanded to extend LE. Minimal hamstring. Poor strength hip extension / flexion. Diminished ankle DF about 50%; Diminished adduction more than abduction of hips    Functional Mobility:  · Bed Mobility:     · Supine to Sit: contact guard assistance - wife assisted patient to long sitting in bed  · Sit to Supine: minimum assistance for leg lift. Patient was provided with a gait belt to  "utilize to assist with getting legs into the bed. Patient able to abduct leg over edge with use of momentum, however unable to adduct opposite leg - reqioromg assistance of spouse. Using full body momentum to scoot to edge of bed  · Transfers:     · Sit to Stand:  moderate assistance with no AD' Wife demonstrated sit to stand technique with good performance to get patient to stand. Has to assist pt with setting LE's with wide base of support and then mod A to stand. Patient with poor strength in UE's to be able to assist with push up. Pt "plops" to sit, uncontrolled.   · Gait: waddling gait pattern, increased lateral sway bilaterally, increased pain in LLE with WB. Attempted use of RW, however because of movement of LE's, potential to get tripped up on walker legs, RW was not appropriate  · Balance: once up on his feet, patient only required supervision to ambulate slowly in halls for approximately 40 feet.    AM-PAC 6 CLICK MOBILITY  Total Score:15       Therapeutic Activities and Exercises:   Discussed DME - poor shldr girdle strength and increased uncontrolled mvmt of LE's make use of RW difficult, unable to really benefit from arm rest of BSC due to lack of shldr strength. TTB may be a consideration, although it is still difficult for him to transfer sit to stand.     AM-PAC 6 CLICK MOBILITY  Total Score:15     Patient left sitting EOB, but wife later transferred him back to supine with call button in reach, RN notified and wife present.    GOALS:   Multidisciplinary Problems     Physical Therapy Goals     Not on file          Multidisciplinary Problems (Resolved)        Problem: Physical Therapy Goal    Goal Priority Disciplines Outcome Goal Variances Interventions   Physical Therapy Goal   (Resolved)     PT, PT/OT Outcome(s) achieved     Description:  Initial evaluation completed. Pt to discharge to home today with wife and 2 children                          History:     Past Medical History:   Diagnosis Date "    Cellulitis     Muscular dystrophy        Past Surgical History:   Procedure Laterality Date    CYST REMOVAL      HERNIA REPAIR         Time Tracking:     PT Received On: 09/12/19  PT Start Time: 1202     PT Stop Time: 1247  PT Total Time (min): 45 min     Billable Minutes: Evaluation 25 and Gait Training 20      Corrina Barrientos, PT  09/12/2019

## 2019-09-13 NOTE — NURSING
Patient safety maintained. Medications administered per order. Monitoring pain level, treating as needed. Assistance provided as needed. Evaluated by PT prior to discharge. Printed documentation provided for discharge. Medications delivered per pharmacy to patients room. IV and telemetry monitor removed, patient tolerated well.

## 2019-09-15 LAB
BACTERIA BLD CULT: NORMAL

## 2024-07-09 ENCOUNTER — APPOINTMENT (RX ONLY)
Dept: URBAN - METROPOLITAN AREA CLINIC 87 | Facility: CLINIC | Age: 34
Setting detail: DERMATOLOGY
End: 2024-07-09

## 2024-07-09 DIAGNOSIS — L73.9 FOLLICULAR DISORDER, UNSPECIFIED: ICD-10-CM | Status: INADEQUATELY CONTROLLED

## 2024-07-09 DIAGNOSIS — L738 OTHER SPECIFIED DISEASES OF HAIR AND HAIR FOLLICLES: ICD-10-CM | Status: INADEQUATELY CONTROLLED

## 2024-07-09 DIAGNOSIS — L64.8 OTHER ANDROGENIC ALOPECIA: ICD-10-CM | Status: INADEQUATELY CONTROLLED

## 2024-07-09 DIAGNOSIS — L91.8 OTHER HYPERTROPHIC DISORDERS OF THE SKIN: ICD-10-CM | Status: STABLE

## 2024-07-09 DIAGNOSIS — L663 OTHER SPECIFIED DISEASES OF HAIR AND HAIR FOLLICLES: ICD-10-CM | Status: INADEQUATELY CONTROLLED

## 2024-07-09 DIAGNOSIS — L21.8 OTHER SEBORRHEIC DERMATITIS: ICD-10-CM | Status: STABLE

## 2024-07-09 PROBLEM — L02.02 FURUNCLE OF FACE: Status: ACTIVE | Noted: 2024-07-09

## 2024-07-09 PROBLEM — L02.821 FURUNCLE OF HEAD [ANY PART, EXCEPT FACE]: Status: ACTIVE | Noted: 2024-07-09

## 2024-07-09 PROCEDURE — ? COUNSELING

## 2024-07-09 PROCEDURE — ? PRESCRIPTION MEDICATION MANAGEMENT

## 2024-07-09 PROCEDURE — 99204 OFFICE O/P NEW MOD 45 MIN: CPT

## 2024-07-09 PROCEDURE — ? MDM - TREATMENT GOALS

## 2024-07-09 PROCEDURE — ? COSMETIC QUOTE

## 2024-07-09 PROCEDURE — ? PRESCRIPTION

## 2024-07-09 RX ORDER — DOXYCYCLINE HYCLATE 100 MG/1
CAPSULE, GELATIN COATED ORAL
Qty: 30 | Refills: 1 | Status: ERX | COMMUNITY
Start: 2024-07-09

## 2024-07-09 RX ORDER — MINOXIDIL 2.5 MG/1
TABLET ORAL
Qty: 30 | Refills: 2 | Status: ERX | COMMUNITY
Start: 2024-07-09

## 2024-07-09 RX ORDER — CLINDAMYCIN PHOSPHATE 10 MG/ML
SOLUTION TOPICAL QAM
Qty: 60 | Refills: 2 | Status: ERX | COMMUNITY
Start: 2024-07-09

## 2024-07-09 RX ORDER — KETOCONAZOLE 20 MG/ML
SHAMPOO, SUSPENSION TOPICAL
Qty: 120 | Refills: 2 | Status: ERX | COMMUNITY
Start: 2024-07-09

## 2024-07-09 RX ADMIN — DOXYCYCLINE HYCLATE: 100 CAPSULE, GELATIN COATED ORAL at 00:00

## 2024-07-09 RX ADMIN — KETOCONAZOLE: 20 SHAMPOO, SUSPENSION TOPICAL at 00:00

## 2024-07-09 RX ADMIN — CLINDAMYCIN PHOSPHATE: 10 SOLUTION TOPICAL at 00:00

## 2024-07-09 RX ADMIN — MINOXIDIL: 2.5 TABLET ORAL at 00:00

## 2024-07-09 ASSESSMENT — LOCATION SIMPLE DESCRIPTION DERM
LOCATION SIMPLE: LEFT SCALP
LOCATION SIMPLE: LEFT CHEEK
LOCATION SIMPLE: ANTERIOR SCALP

## 2024-07-09 ASSESSMENT — PAIN INTENSITY VAS: HOW INTENSE IS YOUR PAIN 0 BEING NO PAIN, 10 BEING THE MOST SEVERE PAIN POSSIBLE?: 2/10 PAIN

## 2024-07-09 ASSESSMENT — SEVERITY ASSESSMENT
SEVERITY: MILD TO MODERATE
HOW SEVERE IS THIS PATIENT'S CONDITION?: MILD

## 2024-07-09 ASSESSMENT — LOCATION DETAILED DESCRIPTION DERM
LOCATION DETAILED: LEFT SUPERIOR CENTRAL MALAR CHEEK
LOCATION DETAILED: LEFT SUPERIOR MEDIAL BUCCAL CHEEK
LOCATION DETAILED: LEFT MEDIAL FRONTAL SCALP
LOCATION DETAILED: MID-FRONTAL SCALP

## 2024-07-09 ASSESSMENT — BSA RASH: BSA RASH: 10

## 2024-07-09 ASSESSMENT — SEVERITY OF ALOPECIA TOOL: % SCALP HAIR LOST: 15

## 2024-07-09 ASSESSMENT — LOCATION ZONE DERM
LOCATION ZONE: SCALP
LOCATION ZONE: FACE

## 2024-07-09 NOTE — PROCEDURE: COSMETIC QUOTE
Sculptra Price Per Syringe: 500
Discount Percentage: 0
Notice: We have created a more complete Cosmetic Quote plan.  The procedure name is also Cosmetic Quote.  Please review the new plan and hide the Cosmetic Quote plan you do not want to use.
Botox Price Per Unit: 15
Misc Procedure Description: skin tag removal
Detail Level: Simple

## 2024-07-09 NOTE — PROCEDURE: PRESCRIPTION MEDICATION MANAGEMENT
Initiate Treatment: clindamycin phosphate 1 % topical solution QAM: Apply a thin layer to AA QAM to AA to scalp.\\n\\ndoxycycline hyclate 100 mg capsule: Take one capsule PO QD with food and water
Detail Level: Simple
Render In Strict Bullet Format?: No
Initiate Treatment: minoxidil 2.5 mg tablet: Take 1/2 tablet PO QD with food and water
Initiate Treatment: ketoconazole 2 % shampoo Three Times Weekly: Wash scalp TIW. Leave in for 5-10 minutes before rinsing.